# Patient Record
Sex: MALE | NOT HISPANIC OR LATINO | URBAN - METROPOLITAN AREA
[De-identification: names, ages, dates, MRNs, and addresses within clinical notes are randomized per-mention and may not be internally consistent; named-entity substitution may affect disease eponyms.]

---

## 2019-01-04 ENCOUNTER — HOSPITAL ENCOUNTER (OUTPATIENT)
Dept: OTHER | Facility: HOSPITAL | Age: 69
Discharge: HOME | End: 2019-01-04

## 2019-01-04 LAB
ALBUMIN SERPL-MCNC: 4.4 G/DL (ref 3.5–5)
ALBUMIN/GLOB SERPL: 1.3 {RATIO} (ref 1.4–2.6)
ALP SERPL-CCNC: 108 U/L (ref 56–155)
ALT SERPL-CCNC: 24 U/L (ref 10–40)
ANION GAP SERPL CALC-SCNC: 21 MMOL/L (ref 8–19)
AST SERPL-CCNC: 20 U/L (ref 15–50)
BASOPHILS # BLD AUTO: 0.01 10*3/UL (ref 0–0.2)
BASOPHILS NFR BLD AUTO: 0.18 % (ref 0–3)
BILIRUB SERPL-MCNC: 0.54 MG/DL (ref 0.2–1.3)
BUN SERPL-MCNC: 14 MG/DL (ref 5–25)
BUN/CREAT SERPL: 9 {RATIO} (ref 6–20)
CALCIUM SERPL-MCNC: 9.1 MG/DL (ref 8.7–10.4)
CHLORIDE SERPL-SCNC: 100 MMOL/L (ref 99–111)
CHOLEST SERPL-MCNC: 106 MG/DL (ref 107–200)
CHOLEST/HDLC SERPL: 3.2 {RATIO} (ref 3–6)
CK SERPL-CCNC: 222 U/L (ref 57–374)
CONV CO2: 23 MMOL/L (ref 22–32)
CONV TOTAL PROTEIN: 7.9 G/DL (ref 6.3–8.2)
CREAT UR-MCNC: 1.5 MG/DL (ref 0.7–1.2)
EOSINOPHIL # BLD AUTO: 0.12 10*3/UL (ref 0–0.7)
EOSINOPHIL # BLD AUTO: 2.55 % (ref 0–7)
ERYTHROCYTE [DISTWIDTH] IN BLOOD BY AUTOMATED COUNT: 13.4 % (ref 11.5–14.5)
GFR SERPLBLD BASED ON 1.73 SQ M-ARVRAT: 55 ML/MIN/{1.73_M2}
GLOBULIN UR ELPH-MCNC: 3.5 G/DL (ref 2–3.5)
GLUCOSE SERPL-MCNC: 174 MG/DL (ref 70–99)
HBA1C MFR BLD: 14.2 G/DL (ref 14–18)
HCT VFR BLD AUTO: 45.7 % (ref 42–52)
HDLC SERPL-MCNC: 33 MG/DL (ref 40–60)
LDLC SERPL CALC-MCNC: 46 MG/DL (ref 70–100)
LYMPHOCYTES # BLD AUTO: 1.27 10*3/UL (ref 1–5)
MAGNESIUM SERPL-MCNC: 1.56 MG/DL (ref 1.6–2.3)
MCH RBC QN AUTO: 25.7 PG (ref 27–31)
MCHC RBC AUTO-ENTMCNC: 31.1 G/DL (ref 33–37)
MCV RBC AUTO: 82.5 FL (ref 80–96)
MONOCYTES # BLD AUTO: 0.55 10*3/UL (ref 0.2–1.2)
MONOCYTES NFR BLD AUTO: 11.2 % (ref 3–10)
NEUTROPHILS # BLD AUTO: 2.93 10*3/UL (ref 2–8)
NEUTROPHILS NFR BLD AUTO: 60.1 % (ref 30–85)
NRBC BLD AUTO-RTO: 0 % (ref 0–0.01)
OSMOLALITY SERPL CALC.SUM OF ELEC: 297 MOSM/KG (ref 273–304)
PHOSPHATE SERPL-MCNC: 2.9 MG/DL (ref 2.4–4.5)
PLATELET # BLD AUTO: 231 10*3/UL (ref 130–400)
PMV BLD AUTO: 9 FL (ref 7.4–10.4)
POTASSIUM SERPL-SCNC: 3.3 MMOL/L (ref 3.5–5.3)
RBC # BLD AUTO: 5.54 10*6/UL (ref 4.7–6.1)
SODIUM SERPL-SCNC: 141 MMOL/L (ref 135–147)
TRIGL SERPL-MCNC: 134 MG/DL (ref 40–150)
URATE SERPL-MCNC: 6 MG/DL (ref 3.5–8.5)
VARIANT LYMPHS NFR BLD MANUAL: 26 % (ref 20–45)
VLDLC SERPL-MCNC: 27 MG/DL (ref 5–37)
WBC # BLD AUTO: 4.88 10*3/UL (ref 4.8–10.8)

## 2019-01-17 ENCOUNTER — HOSPITAL ENCOUNTER (OUTPATIENT)
Dept: OTHER | Facility: HOSPITAL | Age: 69
Discharge: HOME | End: 2019-01-17

## 2019-01-17 LAB
ALBUMIN SERPL-MCNC: 4.2 G/DL (ref 3.5–5)
ALBUMIN/GLOB SERPL: 1.1 {RATIO} (ref 1.4–2.6)
ALP SERPL-CCNC: 110 U/L (ref 56–155)
ALT SERPL-CCNC: 29 U/L (ref 10–40)
ANION GAP SERPL CALC-SCNC: 24 MMOL/L (ref 8–19)
AST SERPL-CCNC: 22 U/L (ref 15–50)
BILIRUB SERPL-MCNC: 0.36 MG/DL (ref 0.2–1.3)
BUN SERPL-MCNC: 16 MG/DL (ref 5–25)
BUN/CREAT SERPL: 10 {RATIO} (ref 6–20)
CALCIUM SERPL-MCNC: 9.6 MG/DL (ref 8.7–10.4)
CHLORIDE SERPL-SCNC: 104 MMOL/L (ref 99–111)
CONV CO2: 24 MMOL/L (ref 22–32)
CONV TOTAL PROTEIN: 7.9 G/DL (ref 6.3–8.2)
CREAT UR-MCNC: 1.67 MG/DL (ref 0.7–1.2)
GFR SERPLBLD BASED ON 1.73 SQ M-ARVRAT: 48 ML/MIN/{1.73_M2}
GLOBULIN UR ELPH-MCNC: 3.7 G/DL (ref 2–3.5)
GLUCOSE SERPL-MCNC: 147 MG/DL (ref 70–99)
OSMOLALITY SERPL CALC.SUM OF ELEC: 310 MOSM/KG (ref 273–304)
POTASSIUM SERPL-SCNC: 3.6 MMOL/L (ref 3.5–5.3)
SODIUM SERPL-SCNC: 148 MMOL/L (ref 135–147)

## 2019-03-07 ENCOUNTER — HOSPITAL ENCOUNTER (OUTPATIENT)
Dept: OTHER | Facility: HOSPITAL | Age: 69
Discharge: HOME | End: 2019-03-07

## 2019-03-07 LAB
ALBUMIN SERPL-MCNC: 4.3 G/DL (ref 3.5–5)
ALBUMIN/GLOB SERPL: 1.3 {RATIO} (ref 1.4–2.6)
ALP SERPL-CCNC: 98 U/L (ref 56–155)
ALT SERPL-CCNC: 27 U/L (ref 10–40)
ANION GAP SERPL CALC-SCNC: 17 MMOL/L (ref 8–19)
AST SERPL-CCNC: 22 U/L (ref 15–50)
BASOPHILS # BLD AUTO: 0.06 10*3/UL (ref 0–0.2)
BASOPHILS NFR BLD AUTO: 1.1 % (ref 0–3)
BILIRUB SERPL-MCNC: 0.3 MG/DL (ref 0.2–1.3)
BUN SERPL-MCNC: 13 MG/DL (ref 5–25)
BUN/CREAT SERPL: 9 {RATIO} (ref 6–20)
CALCIUM SERPL-MCNC: 9.1 MG/DL (ref 8.7–10.4)
CHLORIDE SERPL-SCNC: 103 MMOL/L (ref 99–111)
CHOLEST SERPL-MCNC: 101 MG/DL (ref 107–200)
CHOLEST/HDLC SERPL: 2.6 {RATIO} (ref 3–6)
CK SERPL-CCNC: 138 U/L (ref 57–374)
CONV ABS IMM GRAN: 0.01 10*3/UL (ref 0–0.2)
CONV CO2: 26 MMOL/L (ref 22–32)
CONV IMMATURE GRAN: 0.2 % (ref 0–1.8)
CONV TOTAL PROTEIN: 7.6 G/DL (ref 6.3–8.2)
CREAT UR-MCNC: 1.49 MG/DL (ref 0.7–1.2)
DEPRECATED RDW RBC AUTO: 42.3 FL (ref 35.1–43.9)
EOSINOPHIL # BLD AUTO: 0.16 10*3/UL (ref 0–0.7)
EOSINOPHIL # BLD AUTO: 3 % (ref 0–7)
ERYTHROCYTE [DISTWIDTH] IN BLOOD BY AUTOMATED COUNT: 14.5 % (ref 11.6–14.4)
GFR SERPLBLD BASED ON 1.73 SQ M-ARVRAT: 55 ML/MIN/{1.73_M2}
GLOBULIN UR ELPH-MCNC: 3.3 G/DL (ref 2–3.5)
GLUCOSE SERPL-MCNC: 155 MG/DL (ref 70–99)
HBA1C MFR BLD: 14.6 G/DL (ref 14–18)
HCT VFR BLD AUTO: 47.6 % (ref 42–52)
HDLC SERPL-MCNC: 39 MG/DL (ref 40–60)
LDLC SERPL CALC-MCNC: 37 MG/DL (ref 70–100)
LYMPHOCYTES # BLD AUTO: 1.23 10*3/UL (ref 1–5)
MAGNESIUM SERPL-MCNC: 1.5 MG/DL (ref 1.6–2.3)
MCH RBC QN AUTO: 25.1 PG (ref 27–31)
MCHC RBC AUTO-ENTMCNC: 30.7 G/DL (ref 33–37)
MCV RBC AUTO: 81.9 FL (ref 80–96)
MONOCYTES # BLD AUTO: 0.57 10*3/UL (ref 0.2–1.2)
MONOCYTES NFR BLD AUTO: 10.7 % (ref 3–10)
NEUTROPHILS # BLD AUTO: 3.32 10*3/UL (ref 2–8)
NEUTROPHILS NFR BLD AUTO: 62 % (ref 30–85)
NRBC CBCN: 0 % (ref 0–0.7)
OSMOLALITY SERPL CALC.SUM OF ELEC: 297 MOSM/KG (ref 273–304)
PHOSPHATE SERPL-MCNC: 2.3 MG/DL (ref 2.4–4.5)
PLATELET # BLD AUTO: 291 10*3/UL (ref 130–400)
PMV BLD AUTO: 11.2 FL (ref 9.4–12.4)
POTASSIUM SERPL-SCNC: 3.5 MMOL/L (ref 3.5–5.3)
RBC # BLD AUTO: 5.81 10*6/UL (ref 4.7–6.1)
SODIUM SERPL-SCNC: 142 MMOL/L (ref 135–147)
TRIGL SERPL-MCNC: 126 MG/DL (ref 40–150)
URATE SERPL-MCNC: 5.1 MG/DL (ref 3.5–8.5)
VARIANT LYMPHS NFR BLD MANUAL: 23 % (ref 20–45)
VLDLC SERPL-MCNC: 25 MG/DL (ref 5–37)
WBC # BLD AUTO: 5.35 10*3/UL (ref 4.8–10.8)

## 2019-04-08 ENCOUNTER — HOSPITAL ENCOUNTER (OUTPATIENT)
Dept: OTHER | Facility: HOSPITAL | Age: 69
Discharge: HOME | End: 2019-04-08

## 2019-04-08 LAB
ALBUMIN SERPL-MCNC: 4.2 G/DL (ref 3.5–5)
ALBUMIN/GLOB SERPL: 1.2 {RATIO} (ref 1.4–2.6)
ALP SERPL-CCNC: 93 U/L (ref 56–155)
ALT SERPL-CCNC: 25 U/L (ref 10–40)
ANION GAP SERPL CALC-SCNC: 18 MMOL/L (ref 8–19)
AST SERPL-CCNC: 23 U/L (ref 15–50)
BASOPHILS # BLD AUTO: 0.06 10*3/UL (ref 0–0.2)
BASOPHILS NFR BLD AUTO: 0.9 % (ref 0–3)
BILIRUB SERPL-MCNC: 0.32 MG/DL (ref 0.2–1.3)
BUN SERPL-MCNC: 18 MG/DL (ref 5–25)
BUN/CREAT SERPL: 10 {RATIO} (ref 6–20)
CALCIUM SERPL-MCNC: 9.2 MG/DL (ref 8.7–10.4)
CHLORIDE SERPL-SCNC: 103 MMOL/L (ref 99–111)
CHOLEST SERPL-MCNC: 106 MG/DL (ref 107–200)
CHOLEST/HDLC SERPL: 2.9 {RATIO} (ref 3–6)
CK SERPL-CCNC: 127 U/L (ref 57–374)
CONV ABS IMM GRAN: 0.01 10*3/UL (ref 0–0.2)
CONV CO2: 25 MMOL/L (ref 22–32)
CONV IMMATURE GRAN: 0.2 % (ref 0–1.8)
CONV TOTAL PROTEIN: 7.6 G/DL (ref 6.3–8.2)
CREAT UR-MCNC: 1.72 MG/DL (ref 0.7–1.2)
DEPRECATED RDW RBC AUTO: 42.7 FL (ref 35.1–43.9)
EOSINOPHIL # BLD AUTO: 0.16 10*3/UL (ref 0–0.7)
EOSINOPHIL # BLD AUTO: 2.5 % (ref 0–7)
ERYTHROCYTE [DISTWIDTH] IN BLOOD BY AUTOMATED COUNT: 14.6 % (ref 11.6–14.4)
GFR SERPLBLD BASED ON 1.73 SQ M-ARVRAT: 46 ML/MIN/{1.73_M2}
GLOBULIN UR ELPH-MCNC: 3.4 G/DL (ref 2–3.5)
GLUCOSE SERPL-MCNC: 156 MG/DL (ref 70–99)
HBA1C MFR BLD: 13.5 G/DL (ref 14–18)
HCT VFR BLD AUTO: 43.7 % (ref 42–52)
HDLC SERPL-MCNC: 36 MG/DL (ref 40–60)
LDLC SERPL CALC-MCNC: 42 MG/DL (ref 70–100)
LYMPHOCYTES # BLD AUTO: 1.44 10*3/UL (ref 1–5)
MAGNESIUM SERPL-MCNC: 1.53 MG/DL (ref 1.6–2.3)
MCH RBC QN AUTO: 25.2 PG (ref 27–31)
MCHC RBC AUTO-ENTMCNC: 30.9 G/DL (ref 33–37)
MCV RBC AUTO: 81.7 FL (ref 80–96)
MONOCYTES # BLD AUTO: 0.67 10*3/UL (ref 0.2–1.2)
MONOCYTES NFR BLD AUTO: 10.4 % (ref 3–10)
NEUTROPHILS # BLD AUTO: 4.13 10*3/UL (ref 2–8)
NEUTROPHILS NFR BLD AUTO: 63.7 % (ref 30–85)
NRBC CBCN: 0 % (ref 0–0.7)
OSMOLALITY SERPL CALC.SUM OF ELEC: 299 MOSM/KG (ref 273–304)
PHOSPHATE SERPL-MCNC: 2.8 MG/DL (ref 2.4–4.5)
PLATELET # BLD AUTO: 282 10*3/UL (ref 130–400)
PMV BLD AUTO: 11.2 FL (ref 9.4–12.4)
POTASSIUM SERPL-SCNC: 3.8 MMOL/L (ref 3.5–5.3)
RBC # BLD AUTO: 5.35 10*6/UL (ref 4.7–6.1)
SODIUM SERPL-SCNC: 142 MMOL/L (ref 135–147)
TRIGL SERPL-MCNC: 141 MG/DL (ref 40–150)
URATE SERPL-MCNC: 5.5 MG/DL (ref 3.5–8.5)
VARIANT LYMPHS NFR BLD MANUAL: 22.3 % (ref 20–45)
VLDLC SERPL-MCNC: 28 MG/DL (ref 5–37)
WBC # BLD AUTO: 6.47 10*3/UL (ref 4.8–10.8)

## 2019-05-02 ENCOUNTER — HOSPITAL ENCOUNTER (OUTPATIENT)
Dept: OTHER | Facility: HOSPITAL | Age: 69
Discharge: HOME | End: 2019-05-02

## 2019-05-02 LAB
ALBUMIN SERPL-MCNC: 4.4 G/DL (ref 3.5–5)
ALBUMIN/GLOB SERPL: 1.3 {RATIO} (ref 1.4–2.6)
ALP SERPL-CCNC: 99 U/L (ref 56–155)
ALT SERPL-CCNC: 24 U/L (ref 10–40)
ANION GAP SERPL CALC-SCNC: 19 MMOL/L (ref 8–19)
AST SERPL-CCNC: 18 U/L (ref 15–50)
BASOPHILS # BLD AUTO: 0.04 10*3/UL (ref 0–0.2)
BASOPHILS NFR BLD AUTO: 0.6 % (ref 0–3)
BILIRUB SERPL-MCNC: 0.38 MG/DL (ref 0.2–1.3)
BUN SERPL-MCNC: 13 MG/DL (ref 5–25)
BUN/CREAT SERPL: 9 {RATIO} (ref 6–20)
CALCIUM SERPL-MCNC: 9.1 MG/DL (ref 8.7–10.4)
CHLORIDE SERPL-SCNC: 98 MMOL/L (ref 99–111)
CHOLEST SERPL-MCNC: 106 MG/DL (ref 107–200)
CHOLEST/HDLC SERPL: 2.8 {RATIO} (ref 3–6)
CK SERPL-CCNC: 164 U/L (ref 57–374)
CONV ABS IMM GRAN: 0.02 10*3/UL (ref 0–0.2)
CONV CO2: 26 MMOL/L (ref 22–32)
CONV IMMATURE GRAN: 0.3 % (ref 0–1.8)
CONV TOTAL PROTEIN: 7.8 G/DL (ref 6.3–8.2)
CREAT UR-MCNC: 1.46 MG/DL (ref 0.7–1.2)
DEPRECATED RDW RBC AUTO: 43.9 FL (ref 35.1–43.9)
EOSINOPHIL # BLD AUTO: 0.13 10*3/UL (ref 0–0.7)
EOSINOPHIL # BLD AUTO: 2 % (ref 0–7)
ERYTHROCYTE [DISTWIDTH] IN BLOOD BY AUTOMATED COUNT: 14.7 % (ref 11.6–14.4)
GFR SERPLBLD BASED ON 1.73 SQ M-ARVRAT: 57 ML/MIN/{1.73_M2}
GLOBULIN UR ELPH-MCNC: 3.4 G/DL (ref 2–3.5)
GLUCOSE SERPL-MCNC: 203 MG/DL (ref 70–99)
HBA1C MFR BLD: 13.5 G/DL (ref 14–18)
HCT VFR BLD AUTO: 44.8 % (ref 42–52)
HDLC SERPL-MCNC: 38 MG/DL (ref 40–60)
LDLC SERPL CALC-MCNC: 43 MG/DL (ref 70–100)
LYMPHOCYTES # BLD AUTO: 1.25 10*3/UL (ref 1–5)
MAGNESIUM SERPL-MCNC: 1.32 MG/DL (ref 1.6–2.3)
MCH RBC QN AUTO: 24.7 PG (ref 27–31)
MCHC RBC AUTO-ENTMCNC: 30.1 G/DL (ref 33–37)
MCV RBC AUTO: 82.1 FL (ref 80–96)
MONOCYTES # BLD AUTO: 0.68 10*3/UL (ref 0.2–1.2)
MONOCYTES NFR BLD AUTO: 10.4 % (ref 3–10)
NEUTROPHILS # BLD AUTO: 4.39 10*3/UL (ref 2–8)
NEUTROPHILS NFR BLD AUTO: 67.5 % (ref 30–85)
NRBC CBCN: 0 % (ref 0–0.7)
OSMOLALITY SERPL CALC.SUM OF ELEC: 294 MOSM/KG (ref 273–304)
PHOSPHATE SERPL-MCNC: 2.6 MG/DL (ref 2.4–4.5)
PLATELET # BLD AUTO: 293 10*3/UL (ref 130–400)
PMV BLD AUTO: 11.3 FL (ref 9.4–12.4)
POTASSIUM SERPL-SCNC: 4 MMOL/L (ref 3.5–5.3)
RBC # BLD AUTO: 5.46 10*6/UL (ref 4.7–6.1)
SODIUM SERPL-SCNC: 139 MMOL/L (ref 135–147)
TRIGL SERPL-MCNC: 127 MG/DL (ref 40–150)
URATE SERPL-MCNC: 5.1 MG/DL (ref 3.5–8.5)
VARIANT LYMPHS NFR BLD MANUAL: 19.2 % (ref 20–45)
VLDLC SERPL-MCNC: 25 MG/DL (ref 5–37)
WBC # BLD AUTO: 6.51 10*3/UL (ref 4.8–10.8)

## 2019-06-06 ENCOUNTER — HOSPITAL ENCOUNTER (OUTPATIENT)
Dept: OTHER | Facility: HOSPITAL | Age: 69
Discharge: HOME | End: 2019-06-06

## 2019-06-06 LAB
ALBUMIN SERPL-MCNC: 4.3 G/DL (ref 3.5–5)
ALBUMIN/GLOB SERPL: 1.3 {RATIO} (ref 1.4–2.6)
ALP SERPL-CCNC: 105 U/L (ref 56–155)
ALT SERPL-CCNC: 24 U/L (ref 10–40)
ANION GAP SERPL CALC-SCNC: 21 MMOL/L (ref 8–19)
AST SERPL-CCNC: 18 U/L (ref 15–50)
BASOPHILS # BLD AUTO: 0.05 10*3/UL (ref 0–0.2)
BASOPHILS NFR BLD AUTO: 0.7 % (ref 0–3)
BILIRUB SERPL-MCNC: 0.43 MG/DL (ref 0.2–1.3)
BUN SERPL-MCNC: 13 MG/DL (ref 5–25)
BUN/CREAT SERPL: 8 {RATIO} (ref 6–20)
CALCIUM SERPL-MCNC: 9.5 MG/DL (ref 8.7–10.4)
CHLORIDE SERPL-SCNC: 103 MMOL/L (ref 99–111)
CHOLEST SERPL-MCNC: 106 MG/DL (ref 107–200)
CHOLEST/HDLC SERPL: 2.9 {RATIO} (ref 3–6)
CK SERPL-CCNC: 163 U/L (ref 57–374)
CONV ABS IMM GRAN: 0.02 10*3/UL (ref 0–0.2)
CONV CO2: 24 MMOL/L (ref 22–32)
CONV IMMATURE GRAN: 0.3 % (ref 0–1.8)
CONV TOTAL PROTEIN: 7.6 G/DL (ref 6.3–8.2)
CREAT UR-MCNC: 1.6 MG/DL (ref 0.7–1.2)
DEPRECATED RDW RBC AUTO: 44 FL (ref 35.1–43.9)
EOSINOPHIL # BLD AUTO: 0.15 10*3/UL (ref 0–0.7)
EOSINOPHIL # BLD AUTO: 2 % (ref 0–7)
ERYTHROCYTE [DISTWIDTH] IN BLOOD BY AUTOMATED COUNT: 14.7 % (ref 11.6–14.4)
GFR SERPLBLD BASED ON 1.73 SQ M-ARVRAT: 51 ML/MIN/{1.73_M2}
GLOBULIN UR ELPH-MCNC: 3.3 G/DL (ref 2–3.5)
GLUCOSE SERPL-MCNC: 168 MG/DL (ref 70–99)
HBA1C MFR BLD: 13.8 G/DL (ref 14–18)
HCT VFR BLD AUTO: 45.3 % (ref 42–52)
HDLC SERPL-MCNC: 37 MG/DL (ref 40–60)
LDLC SERPL CALC-MCNC: 47 MG/DL (ref 70–100)
LYMPHOCYTES # BLD AUTO: 1.44 10*3/UL (ref 1–5)
MAGNESIUM SERPL-MCNC: 1.46 MG/DL (ref 1.6–2.3)
MCH RBC QN AUTO: 25.4 PG (ref 27–31)
MCHC RBC AUTO-ENTMCNC: 30.5 G/DL (ref 33–37)
MCV RBC AUTO: 83.3 FL (ref 80–96)
MONOCYTES # BLD AUTO: 0.91 10*3/UL (ref 0.2–1.2)
MONOCYTES NFR BLD AUTO: 12.2 % (ref 3–10)
NEUTROPHILS # BLD AUTO: 4.86 10*3/UL (ref 2–8)
NEUTROPHILS NFR BLD AUTO: 65.4 % (ref 30–85)
NRBC CBCN: 0 % (ref 0–0.7)
OSMOLALITY SERPL CALC.SUM OF ELEC: 302 MOSM/KG (ref 273–304)
PHOSPHATE SERPL-MCNC: 3 MG/DL (ref 2.4–4.5)
PLATELET # BLD AUTO: 298 10*3/UL (ref 130–400)
PMV BLD AUTO: 11.4 FL (ref 9.4–12.4)
POTASSIUM SERPL-SCNC: 3.8 MMOL/L (ref 3.5–5.3)
RBC # BLD AUTO: 5.44 10*6/UL (ref 4.7–6.1)
SODIUM SERPL-SCNC: 144 MMOL/L (ref 135–147)
TRIGL SERPL-MCNC: 110 MG/DL (ref 40–150)
URATE SERPL-MCNC: 5.2 MG/DL (ref 3.5–8.5)
VARIANT LYMPHS NFR BLD MANUAL: 19.4 % (ref 20–45)
VLDLC SERPL-MCNC: 22 MG/DL (ref 5–37)
WBC # BLD AUTO: 7.43 10*3/UL (ref 4.8–10.8)

## 2019-07-09 ENCOUNTER — HOSPITAL ENCOUNTER (OUTPATIENT)
Dept: OTHER | Facility: HOSPITAL | Age: 69
Discharge: HOME | End: 2019-07-09

## 2019-07-09 LAB
ALBUMIN SERPL-MCNC: 4.4 G/DL (ref 3.5–5)
ALBUMIN/GLOB SERPL: 1.4 {RATIO} (ref 1.4–2.6)
ALP SERPL-CCNC: 97 U/L (ref 56–155)
ALT SERPL-CCNC: 26 U/L (ref 10–40)
ANION GAP SERPL CALC-SCNC: 21 MMOL/L (ref 8–19)
AST SERPL-CCNC: 20 U/L (ref 15–50)
BASOPHILS # BLD AUTO: 0.04 10*3/UL (ref 0–0.2)
BASOPHILS NFR BLD AUTO: 0.7 % (ref 0–3)
BILIRUB SERPL-MCNC: 0.37 MG/DL (ref 0.2–1.3)
BUN SERPL-MCNC: 15 MG/DL (ref 5–25)
BUN/CREAT SERPL: 9 {RATIO} (ref 6–20)
CALCIUM SERPL-MCNC: 9.2 MG/DL (ref 8.7–10.4)
CHLORIDE SERPL-SCNC: 102 MMOL/L (ref 99–111)
CHOLEST SERPL-MCNC: 99 MG/DL (ref 107–200)
CHOLEST/HDLC SERPL: 2.7 {RATIO} (ref 3–6)
CK SERPL-CCNC: 165 U/L (ref 57–374)
CONV ABS IMM GRAN: 0.02 10*3/UL (ref 0–0.2)
CONV CO2: 24 MMOL/L (ref 22–32)
CONV IMMATURE GRAN: 0.3 % (ref 0–1.8)
CONV TOTAL PROTEIN: 7.6 G/DL (ref 6.3–8.2)
CREAT UR-MCNC: 1.73 MG/DL (ref 0.7–1.2)
DEPRECATED RDW RBC AUTO: 42.5 FL (ref 35.1–43.9)
EOSINOPHIL # BLD AUTO: 0.15 10*3/UL (ref 0–0.7)
EOSINOPHIL # BLD AUTO: 2.5 % (ref 0–7)
ERYTHROCYTE [DISTWIDTH] IN BLOOD BY AUTOMATED COUNT: 14.4 % (ref 11.6–14.4)
EST. AVERAGE GLUCOSE BLD GHB EST-MCNC: 166 MG/DL
GFR SERPLBLD BASED ON 1.73 SQ M-ARVRAT: 46 ML/MIN/{1.73_M2}
GLOBULIN UR ELPH-MCNC: 3.2 G/DL (ref 2–3.5)
GLUCOSE SERPL-MCNC: 149 MG/DL (ref 70–99)
HBA1C MFR BLD: 13.7 G/DL (ref 14–18)
HBA1C MFR BLD: 7.4 % (ref 3.5–5.7)
HCT VFR BLD AUTO: 44.1 % (ref 42–52)
HDLC SERPL-MCNC: 37 MG/DL (ref 40–60)
LDLC SERPL CALC-MCNC: 34 MG/DL (ref 70–100)
LYMPHOCYTES # BLD AUTO: 1.54 10*3/UL (ref 1–5)
MAGNESIUM SERPL-MCNC: 1.46 MG/DL (ref 1.6–2.3)
MCH RBC QN AUTO: 25.6 PG (ref 27–31)
MCHC RBC AUTO-ENTMCNC: 31.1 G/DL (ref 33–37)
MCV RBC AUTO: 82.4 FL (ref 80–96)
MONOCYTES # BLD AUTO: 0.66 10*3/UL (ref 0.2–1.2)
MONOCYTES NFR BLD AUTO: 10.8 % (ref 3–10)
NEUTROPHILS # BLD AUTO: 3.7 10*3/UL (ref 2–8)
NEUTROPHILS NFR BLD AUTO: 60.5 % (ref 30–85)
NRBC CBCN: 0 % (ref 0–0.7)
OSMOLALITY SERPL CALC.SUM OF ELEC: 300 MOSM/KG (ref 273–304)
PHOSPHATE SERPL-MCNC: 2.5 MG/DL (ref 2.4–4.5)
PLATELET # BLD AUTO: 275 10*3/UL (ref 130–400)
PMV BLD AUTO: 11 FL (ref 9.4–12.4)
POTASSIUM SERPL-SCNC: 3.6 MMOL/L (ref 3.5–5.3)
RBC # BLD AUTO: 5.35 10*6/UL (ref 4.7–6.1)
SODIUM SERPL-SCNC: 143 MMOL/L (ref 135–147)
TRIGL SERPL-MCNC: 141 MG/DL (ref 40–150)
URATE SERPL-MCNC: 4.9 MG/DL (ref 3.5–8.5)
VARIANT LYMPHS NFR BLD MANUAL: 25.2 % (ref 20–45)
VLDLC SERPL-MCNC: 28 MG/DL (ref 5–37)
WBC # BLD AUTO: 6.11 10*3/UL (ref 4.8–10.8)

## 2019-08-01 ENCOUNTER — HOSPITAL ENCOUNTER (OUTPATIENT)
Dept: OTHER | Facility: HOSPITAL | Age: 69
Discharge: HOME | End: 2019-08-01

## 2019-08-01 LAB
ALBUMIN SERPL-MCNC: 4.5 G/DL (ref 3.5–5)
ALBUMIN/GLOB SERPL: 1.4 {RATIO} (ref 1.4–2.6)
ALP SERPL-CCNC: 103 U/L (ref 56–155)
ALT SERPL-CCNC: 20 U/L (ref 10–40)
ANION GAP SERPL CALC-SCNC: 21 MMOL/L (ref 8–19)
AST SERPL-CCNC: 18 U/L (ref 15–50)
BASOPHILS # BLD AUTO: 0.05 10*3/UL (ref 0–0.2)
BASOPHILS NFR BLD AUTO: 0.8 % (ref 0–3)
BILIRUB SERPL-MCNC: 0.48 MG/DL (ref 0.2–1.3)
BUN SERPL-MCNC: 17 MG/DL (ref 5–25)
BUN/CREAT SERPL: 10 {RATIO} (ref 6–20)
CALCIUM SERPL-MCNC: 9.3 MG/DL (ref 8.7–10.4)
CHLORIDE SERPL-SCNC: 102 MMOL/L (ref 99–111)
CHOLEST SERPL-MCNC: 119 MG/DL (ref 107–200)
CHOLEST/HDLC SERPL: 3.5 {RATIO} (ref 3–6)
CK SERPL-CCNC: 153 U/L (ref 57–374)
CONV ABS IMM GRAN: 0.02 10*3/UL (ref 0–0.2)
CONV CO2: 24 MMOL/L (ref 22–32)
CONV IMMATURE GRAN: 0.3 % (ref 0–1.8)
CONV TOTAL PROTEIN: 7.7 G/DL (ref 6.3–8.2)
CREAT UR-MCNC: 1.69 MG/DL (ref 0.7–1.2)
DEPRECATED RDW RBC AUTO: 42.8 FL (ref 35.1–43.9)
EOSINOPHIL # BLD AUTO: 0.16 10*3/UL (ref 0–0.7)
EOSINOPHIL # BLD AUTO: 2.4 % (ref 0–7)
ERYTHROCYTE [DISTWIDTH] IN BLOOD BY AUTOMATED COUNT: 14.4 % (ref 11.6–14.4)
GFR SERPLBLD BASED ON 1.73 SQ M-ARVRAT: 47 ML/MIN/{1.73_M2}
GLOBULIN UR ELPH-MCNC: 3.2 G/DL (ref 2–3.5)
GLUCOSE SERPL-MCNC: 165 MG/DL (ref 70–99)
HBA1C MFR BLD: 14 G/DL (ref 14–18)
HCT VFR BLD AUTO: 45.8 % (ref 42–52)
HDLC SERPL-MCNC: 34 MG/DL (ref 40–60)
LDLC SERPL CALC-MCNC: 45 MG/DL (ref 70–100)
LYMPHOCYTES # BLD AUTO: 1.74 10*3/UL (ref 1–5)
MAGNESIUM SERPL-MCNC: 1.45 MG/DL (ref 1.6–2.3)
MCH RBC QN AUTO: 25.3 PG (ref 27–31)
MCHC RBC AUTO-ENTMCNC: 30.6 G/DL (ref 33–37)
MCV RBC AUTO: 82.8 FL (ref 80–96)
MONOCYTES # BLD AUTO: 0.58 10*3/UL (ref 0.2–1.2)
MONOCYTES NFR BLD AUTO: 8.8 % (ref 3–10)
NEUTROPHILS # BLD AUTO: 4.01 10*3/UL (ref 2–8)
NEUTROPHILS NFR BLD AUTO: 61.2 % (ref 30–85)
NRBC CBCN: 0 % (ref 0–0.7)
OSMOLALITY SERPL CALC.SUM OF ELEC: 301 MOSM/KG (ref 273–304)
PHOSPHATE SERPL-MCNC: 2.6 MG/DL (ref 2.4–4.5)
PLATELET # BLD AUTO: 285 10*3/UL (ref 130–400)
PMV BLD AUTO: 10.8 FL (ref 9.4–12.4)
POTASSIUM SERPL-SCNC: 4 MMOL/L (ref 3.5–5.3)
RBC # BLD AUTO: 5.53 10*6/UL (ref 4.7–6.1)
SODIUM SERPL-SCNC: 143 MMOL/L (ref 135–147)
TRIGL SERPL-MCNC: 198 MG/DL (ref 40–150)
URATE SERPL-MCNC: 5.9 MG/DL (ref 3.5–8.5)
VARIANT LYMPHS NFR BLD MANUAL: 26.5 % (ref 20–45)
VLDLC SERPL-MCNC: 40 MG/DL (ref 5–37)
WBC # BLD AUTO: 6.56 10*3/UL (ref 4.8–10.8)

## 2019-09-05 ENCOUNTER — HOSPITAL ENCOUNTER (OUTPATIENT)
Dept: OTHER | Facility: HOSPITAL | Age: 69
Discharge: HOME | End: 2019-09-05

## 2019-09-05 LAB
ALBUMIN SERPL-MCNC: 4.5 G/DL (ref 3.5–5)
ALBUMIN/GLOB SERPL: 1.5 {RATIO} (ref 1.4–2.6)
ALP SERPL-CCNC: 111 U/L (ref 56–155)
ALT SERPL-CCNC: 20 U/L (ref 10–40)
ANION GAP SERPL CALC-SCNC: 24 MMOL/L (ref 8–19)
AST SERPL-CCNC: 19 U/L (ref 15–50)
BASOPHILS # BLD AUTO: 0.05 10*3/UL (ref 0–0.2)
BASOPHILS NFR BLD AUTO: 0.7 % (ref 0–3)
BILIRUB SERPL-MCNC: 0.37 MG/DL (ref 0.2–1.3)
BUN SERPL-MCNC: 14 MG/DL (ref 5–25)
BUN/CREAT SERPL: 8 {RATIO} (ref 6–20)
CALCIUM SERPL-MCNC: 9 MG/DL (ref 8.7–10.4)
CHLORIDE SERPL-SCNC: 107 MMOL/L (ref 99–111)
CHOLEST SERPL-MCNC: 113 MG/DL (ref 107–200)
CHOLEST/HDLC SERPL: 3.2 {RATIO} (ref 3–6)
CK SERPL-CCNC: 170 U/L (ref 57–374)
CONV ABS IMM GRAN: 0.02 10*3/UL (ref 0–0.2)
CONV CO2: 19 MMOL/L (ref 22–32)
CONV IMMATURE GRAN: 0.3 % (ref 0–1.8)
CONV TOTAL PROTEIN: 7.6 G/DL (ref 6.3–8.2)
CREAT UR-MCNC: 1.69 MG/DL (ref 0.7–1.2)
DEPRECATED RDW RBC AUTO: 42 FL (ref 35.1–43.9)
EOSINOPHIL # BLD AUTO: 0.17 10*3/UL (ref 0–0.7)
EOSINOPHIL # BLD AUTO: 2.5 % (ref 0–7)
ERYTHROCYTE [DISTWIDTH] IN BLOOD BY AUTOMATED COUNT: 14.5 % (ref 11.6–14.4)
GFR SERPLBLD BASED ON 1.73 SQ M-ARVRAT: 47 ML/MIN/{1.73_M2}
GLOBULIN UR ELPH-MCNC: 3.1 G/DL (ref 2–3.5)
GLUCOSE SERPL-MCNC: 177 MG/DL (ref 70–99)
HCT VFR BLD AUTO: 44.3 % (ref 42–52)
HDLC SERPL-MCNC: 35 MG/DL (ref 40–60)
HGB BLD-MCNC: 13.6 G/DL (ref 14–18)
LDLC SERPL CALC-MCNC: 41 MG/DL (ref 70–100)
LYMPHOCYTES # BLD AUTO: 1.51 10*3/UL (ref 1–5)
LYMPHOCYTES NFR BLD AUTO: 21.9 % (ref 20–45)
MAGNESIUM SERPL-MCNC: 1.52 MG/DL (ref 1.6–2.3)
MCH RBC QN AUTO: 25.1 PG (ref 27–31)
MCHC RBC AUTO-ENTMCNC: 30.7 G/DL (ref 33–37)
MCV RBC AUTO: 81.9 FL (ref 80–96)
MONOCYTES # BLD AUTO: 0.88 10*3/UL (ref 0.2–1.2)
MONOCYTES NFR BLD AUTO: 12.8 % (ref 3–10)
NEUTROPHILS # BLD AUTO: 4.25 10*3/UL (ref 2–8)
NEUTROPHILS NFR BLD AUTO: 61.8 % (ref 30–85)
NRBC CBCN: 0 % (ref 0–0.7)
OSMOLALITY SERPL CALC.SUM OF ELEC: 307 MOSM/KG (ref 273–304)
PHOSPHATE SERPL-MCNC: 2.9 MG/DL (ref 2.4–4.5)
PLATELET # BLD AUTO: 299 10*3/UL (ref 130–400)
PMV BLD AUTO: 11 FL (ref 9.4–12.4)
POTASSIUM SERPL-SCNC: 3.7 MMOL/L (ref 3.5–5.3)
RBC # BLD AUTO: 5.41 10*6/UL (ref 4.7–6.1)
SODIUM SERPL-SCNC: 146 MMOL/L (ref 135–147)
TRIGL SERPL-MCNC: 184 MG/DL (ref 40–150)
URATE SERPL-MCNC: 4.6 MG/DL (ref 3.5–8.5)
VLDLC SERPL-MCNC: 37 MG/DL (ref 5–37)
WBC # BLD AUTO: 6.88 10*3/UL (ref 4.8–10.8)

## 2019-10-04 ENCOUNTER — HOSPITAL ENCOUNTER (OUTPATIENT)
Dept: OTHER | Facility: HOSPITAL | Age: 69
Discharge: HOME | End: 2019-10-04

## 2019-10-04 LAB
ALBUMIN SERPL-MCNC: 4.1 G/DL (ref 3.5–5)
ALBUMIN/GLOB SERPL: 1.4 {RATIO} (ref 1.4–2.6)
ALP SERPL-CCNC: 93 U/L (ref 56–155)
ALT SERPL-CCNC: 22 U/L (ref 10–40)
ANION GAP SERPL CALC-SCNC: 18 MMOL/L (ref 8–19)
AST SERPL-CCNC: 18 U/L (ref 15–50)
BASOPHILS # BLD AUTO: 0.03 10*3/UL (ref 0–0.2)
BASOPHILS NFR BLD AUTO: 0.6 % (ref 0–3)
BILIRUB SERPL-MCNC: 0.25 MG/DL (ref 0.2–1.3)
BUN SERPL-MCNC: 16 MG/DL (ref 5–25)
BUN/CREAT SERPL: 10 {RATIO} (ref 6–20)
CALCIUM SERPL-MCNC: 9 MG/DL (ref 8.7–10.4)
CHLORIDE SERPL-SCNC: 106 MMOL/L (ref 99–111)
CHOLEST SERPL-MCNC: 96 MG/DL (ref 107–200)
CHOLEST/HDLC SERPL: 2.7 {RATIO} (ref 3–6)
CK SERPL-CCNC: 156 U/L (ref 57–374)
CONV ABS IMM GRAN: 0.01 10*3/UL (ref 0–0.2)
CONV CO2: 23 MMOL/L (ref 22–32)
CONV IMMATURE GRAN: 0.2 % (ref 0–1.8)
CONV TOTAL PROTEIN: 7.1 G/DL (ref 6.3–8.2)
CREAT UR-MCNC: 1.54 MG/DL (ref 0.7–1.2)
DEPRECATED RDW RBC AUTO: 44.2 FL (ref 35.1–43.9)
EOSINOPHIL # BLD AUTO: 0.14 10*3/UL (ref 0–0.7)
EOSINOPHIL # BLD AUTO: 3 % (ref 0–7)
ERYTHROCYTE [DISTWIDTH] IN BLOOD BY AUTOMATED COUNT: 14.8 % (ref 11.6–14.4)
EST. AVERAGE GLUCOSE BLD GHB EST-MCNC: 157 MG/DL
GFR SERPLBLD BASED ON 1.73 SQ M-ARVRAT: 53 ML/MIN/{1.73_M2}
GLOBULIN UR ELPH-MCNC: 3 G/DL (ref 2–3.5)
GLUCOSE SERPL-MCNC: 140 MG/DL (ref 70–99)
HBA1C MFR BLD: 7.1 % (ref 3.5–5.7)
HCT VFR BLD AUTO: 41.6 % (ref 42–52)
HDLC SERPL-MCNC: 35 MG/DL (ref 40–60)
HGB BLD-MCNC: 12.6 G/DL (ref 14–18)
LDLC SERPL CALC-MCNC: 41 MG/DL (ref 70–100)
LYMPHOCYTES # BLD AUTO: 0.83 10*3/UL (ref 1–5)
LYMPHOCYTES NFR BLD AUTO: 17.5 % (ref 20–45)
MAGNESIUM SERPL-MCNC: 1.55 MG/DL (ref 1.6–2.3)
MCH RBC QN AUTO: 25 PG (ref 27–31)
MCHC RBC AUTO-ENTMCNC: 30.3 G/DL (ref 33–37)
MCV RBC AUTO: 82.4 FL (ref 80–96)
MONOCYTES # BLD AUTO: 0.73 10*3/UL (ref 0.2–1.2)
MONOCYTES NFR BLD AUTO: 15.4 % (ref 3–10)
NEUTROPHILS # BLD AUTO: 3 10*3/UL (ref 2–8)
NEUTROPHILS NFR BLD AUTO: 63.3 % (ref 30–85)
NRBC CBCN: 0 % (ref 0–0.7)
OSMOLALITY SERPL CALC.SUM OF ELEC: 299 MOSM/KG (ref 273–304)
PHOSPHATE SERPL-MCNC: 3 MG/DL (ref 2.4–4.5)
PLATELET # BLD AUTO: 283 10*3/UL (ref 130–400)
PMV BLD AUTO: 10.7 FL (ref 9.4–12.4)
POTASSIUM SERPL-SCNC: 4.3 MMOL/L (ref 3.5–5.3)
RBC # BLD AUTO: 5.05 10*6/UL (ref 4.7–6.1)
SODIUM SERPL-SCNC: 143 MMOL/L (ref 135–147)
TRIGL SERPL-MCNC: 101 MG/DL (ref 40–150)
URATE SERPL-MCNC: 5.4 MG/DL (ref 3.5–8.5)
VLDLC SERPL-MCNC: 20 MG/DL (ref 5–37)
WBC # BLD AUTO: 4.74 10*3/UL (ref 4.8–10.8)

## 2019-11-08 ENCOUNTER — HOSPITAL ENCOUNTER (OUTPATIENT)
Dept: OTHER | Facility: HOSPITAL | Age: 69
Discharge: HOME | End: 2019-11-08

## 2019-11-08 LAB
ALBUMIN SERPL-MCNC: 4.4 G/DL (ref 3.5–5)
ALBUMIN/GLOB SERPL: 1.3 {RATIO} (ref 1.4–2.6)
ALP SERPL-CCNC: 99 U/L (ref 56–155)
ALT SERPL-CCNC: 16 U/L (ref 10–40)
ANION GAP SERPL CALC-SCNC: 22 MMOL/L (ref 8–19)
AST SERPL-CCNC: 17 U/L (ref 15–50)
BASOPHILS # BLD AUTO: 0.06 10*3/UL (ref 0–0.2)
BASOPHILS NFR BLD AUTO: 0.9 % (ref 0–3)
BILIRUB SERPL-MCNC: 0.42 MG/DL (ref 0.2–1.3)
BUN SERPL-MCNC: 12 MG/DL (ref 5–25)
BUN/CREAT SERPL: 7 {RATIO} (ref 6–20)
CALCIUM SERPL-MCNC: 9.3 MG/DL (ref 8.7–10.4)
CHLORIDE SERPL-SCNC: 101 MMOL/L (ref 99–111)
CHOLEST SERPL-MCNC: 103 MG/DL (ref 107–200)
CHOLEST/HDLC SERPL: 2.7 {RATIO} (ref 3–6)
CK SERPL-CCNC: 130 U/L (ref 57–374)
CONV ABS IMM GRAN: 0.03 10*3/UL (ref 0–0.2)
CONV CO2: 23 MMOL/L (ref 22–32)
CONV IMMATURE GRAN: 0.5 % (ref 0–1.8)
CONV TOTAL PROTEIN: 7.9 G/DL (ref 6.3–8.2)
CREAT UR-MCNC: 1.71 MG/DL (ref 0.7–1.2)
DEPRECATED RDW RBC AUTO: 42 FL (ref 35.1–43.9)
EOSINOPHIL # BLD AUTO: 0.16 10*3/UL (ref 0–0.7)
EOSINOPHIL # BLD AUTO: 2.4 % (ref 0–7)
ERYTHROCYTE [DISTWIDTH] IN BLOOD BY AUTOMATED COUNT: 14.5 % (ref 11.6–14.4)
GFR SERPLBLD BASED ON 1.73 SQ M-ARVRAT: 47 ML/MIN/{1.73_M2}
GLOBULIN UR ELPH-MCNC: 3.5 G/DL (ref 2–3.5)
GLUCOSE SERPL-MCNC: 116 MG/DL (ref 70–99)
HCT VFR BLD AUTO: 43.5 % (ref 42–52)
HDLC SERPL-MCNC: 38 MG/DL (ref 40–60)
HGB BLD-MCNC: 13.5 G/DL (ref 14–18)
LDLC SERPL CALC-MCNC: 38 MG/DL (ref 70–100)
LYMPHOCYTES # BLD AUTO: 1.4 10*3/UL (ref 1–5)
LYMPHOCYTES NFR BLD AUTO: 21.3 % (ref 20–45)
MAGNESIUM SERPL-MCNC: 1.34 MG/DL (ref 1.6–2.3)
MCH RBC QN AUTO: 25.1 PG (ref 27–31)
MCHC RBC AUTO-ENTMCNC: 31 G/DL (ref 33–37)
MCV RBC AUTO: 81 FL (ref 80–96)
MONOCYTES # BLD AUTO: 0.79 10*3/UL (ref 0.2–1.2)
MONOCYTES NFR BLD AUTO: 12 % (ref 3–10)
NEUTROPHILS # BLD AUTO: 4.12 10*3/UL (ref 2–8)
NEUTROPHILS NFR BLD AUTO: 62.9 % (ref 30–85)
NRBC CBCN: 0 % (ref 0–0.7)
OSMOLALITY SERPL CALC.SUM OF ELEC: 295 MOSM/KG (ref 273–304)
PHOSPHATE SERPL-MCNC: 2 MG/DL (ref 2.4–4.5)
PLATELET # BLD AUTO: 290 10*3/UL (ref 130–400)
PMV BLD AUTO: 11.1 FL (ref 9.4–12.4)
POTASSIUM SERPL-SCNC: 3.6 MMOL/L (ref 3.5–5.3)
RBC # BLD AUTO: 5.37 10*6/UL (ref 4.7–6.1)
SODIUM SERPL-SCNC: 142 MMOL/L (ref 135–147)
TRIGL SERPL-MCNC: 134 MG/DL (ref 40–150)
URATE SERPL-MCNC: 4.5 MG/DL (ref 3.5–8.5)
VLDLC SERPL-MCNC: 27 MG/DL (ref 5–37)
WBC # BLD AUTO: 6.56 10*3/UL (ref 4.8–10.8)

## 2019-12-05 ENCOUNTER — HOSPITAL ENCOUNTER (OUTPATIENT)
Dept: OTHER | Facility: HOSPITAL | Age: 69
Discharge: HOME | End: 2019-12-05

## 2019-12-05 LAB
ALBUMIN SERPL-MCNC: 4.2 G/DL (ref 3.5–5)
ALBUMIN/GLOB SERPL: 1.3 {RATIO} (ref 1.4–2.6)
ALP SERPL-CCNC: 99 U/L (ref 56–155)
ALT SERPL-CCNC: 21 U/L (ref 10–40)
ANION GAP SERPL CALC-SCNC: 19 MMOL/L (ref 8–19)
AST SERPL-CCNC: 18 U/L (ref 15–50)
BASOPHILS # BLD AUTO: 0.06 10*3/UL (ref 0–0.2)
BASOPHILS NFR BLD AUTO: 0.7 % (ref 0–3)
BILIRUB SERPL-MCNC: 0.34 MG/DL (ref 0.2–1.3)
BUN SERPL-MCNC: 19 MG/DL (ref 5–25)
BUN/CREAT SERPL: 13 {RATIO} (ref 6–20)
CALCIUM SERPL-MCNC: 9.9 MG/DL (ref 8.7–10.4)
CHLORIDE SERPL-SCNC: 103 MMOL/L (ref 99–111)
CHOLEST SERPL-MCNC: 118 MG/DL (ref 107–200)
CHOLEST/HDLC SERPL: 3.3 {RATIO} (ref 3–6)
CK SERPL-CCNC: 213 U/L (ref 57–374)
CONV ABS IMM GRAN: 0.02 10*3/UL (ref 0–0.2)
CONV CO2: 24 MMOL/L (ref 22–32)
CONV IMMATURE GRAN: 0.2 % (ref 0–1.8)
CONV TOTAL PROTEIN: 7.5 G/DL (ref 6.3–8.2)
CREAT UR-MCNC: 1.46 MG/DL (ref 0.7–1.2)
DEPRECATED RDW RBC AUTO: 42 FL (ref 35.1–43.9)
EOSINOPHIL # BLD AUTO: 0.18 10*3/UL (ref 0–0.7)
EOSINOPHIL # BLD AUTO: 2.2 % (ref 0–7)
ERYTHROCYTE [DISTWIDTH] IN BLOOD BY AUTOMATED COUNT: 14.3 % (ref 11.6–14.4)
GFR SERPLBLD BASED ON 1.73 SQ M-ARVRAT: 56 ML/MIN/{1.73_M2}
GLOBULIN UR ELPH-MCNC: 3.3 G/DL (ref 2–3.5)
GLUCOSE SERPL-MCNC: 131 MG/DL (ref 70–99)
HCT VFR BLD AUTO: 44.4 % (ref 42–52)
HDLC SERPL-MCNC: 36 MG/DL (ref 40–60)
HGB BLD-MCNC: 13.4 G/DL (ref 14–18)
LDLC SERPL CALC-MCNC: 46 MG/DL (ref 70–100)
LYMPHOCYTES # BLD AUTO: 1.58 10*3/UL (ref 1–5)
LYMPHOCYTES NFR BLD AUTO: 19.5 % (ref 20–45)
MAGNESIUM SERPL-MCNC: 1.57 MG/DL (ref 1.6–2.3)
MCH RBC QN AUTO: 24.9 PG (ref 27–31)
MCHC RBC AUTO-ENTMCNC: 30.2 G/DL (ref 33–37)
MCV RBC AUTO: 82.4 FL (ref 80–96)
MONOCYTES # BLD AUTO: 0.79 10*3/UL (ref 0.2–1.2)
MONOCYTES NFR BLD AUTO: 9.7 % (ref 3–10)
NEUTROPHILS # BLD AUTO: 5.48 10*3/UL (ref 2–8)
NEUTROPHILS NFR BLD AUTO: 67.7 % (ref 30–85)
NRBC CBCN: 0 % (ref 0–0.7)
OSMOLALITY SERPL CALC.SUM OF ELEC: 298 MOSM/KG (ref 273–304)
PHOSPHATE SERPL-MCNC: 3.5 MG/DL (ref 2.4–4.5)
PLATELET # BLD AUTO: 276 10*3/UL (ref 130–400)
PMV BLD AUTO: 10.6 FL (ref 9.4–12.4)
POTASSIUM SERPL-SCNC: 3.8 MMOL/L (ref 3.5–5.3)
RBC # BLD AUTO: 5.39 10*6/UL (ref 4.7–6.1)
SODIUM SERPL-SCNC: 142 MMOL/L (ref 135–147)
TRIGL SERPL-MCNC: 178 MG/DL (ref 40–150)
URATE SERPL-MCNC: 4.8 MG/DL (ref 3.5–8.5)
VLDLC SERPL-MCNC: 36 MG/DL (ref 5–37)
WBC # BLD AUTO: 8.11 10*3/UL (ref 4.8–10.8)

## 2020-02-07 ENCOUNTER — HOSPITAL ENCOUNTER (OUTPATIENT)
Dept: OTHER | Facility: HOSPITAL | Age: 70
Discharge: HOME | End: 2020-02-07

## 2020-02-07 LAB
ALBUMIN SERPL-MCNC: 4 G/DL (ref 3.5–5)
ALBUMIN/GLOB SERPL: 1.3 {RATIO} (ref 1.4–2.6)
ALP SERPL-CCNC: 89 U/L (ref 56–155)
ALT SERPL-CCNC: 22 U/L (ref 10–40)
ANION GAP SERPL CALC-SCNC: 19 MMOL/L (ref 8–19)
AST SERPL-CCNC: 19 U/L (ref 15–50)
BASOPHILS # BLD AUTO: 0.04 10*3/UL (ref 0–0.2)
BASOPHILS NFR BLD AUTO: 0.7 % (ref 0–3)
BILIRUB SERPL-MCNC: 0.31 MG/DL (ref 0.2–1.3)
BUN SERPL-MCNC: 12 MG/DL (ref 5–25)
BUN/CREAT SERPL: 9 {RATIO} (ref 6–20)
CALCIUM SERPL-MCNC: 9 MG/DL (ref 8.7–10.4)
CHLORIDE SERPL-SCNC: 104 MMOL/L (ref 99–111)
CHOLEST SERPL-MCNC: 112 MG/DL (ref 107–200)
CHOLEST/HDLC SERPL: 3.4 {RATIO} (ref 3–6)
CK SERPL-CCNC: 134 U/L (ref 57–374)
CONV ABS IMM GRAN: 0.02 10*3/UL (ref 0–0.2)
CONV CO2: 23 MMOL/L (ref 22–32)
CONV IMMATURE GRAN: 0.4 % (ref 0–1.8)
CONV TOTAL PROTEIN: 7.2 G/DL (ref 6.3–8.2)
CREAT UR-MCNC: 1.36 MG/DL (ref 0.7–1.2)
DEPRECATED RDW RBC AUTO: 44.4 FL (ref 35.1–43.9)
EOSINOPHIL # BLD AUTO: 0.14 10*3/UL (ref 0–0.7)
EOSINOPHIL # BLD AUTO: 2.5 % (ref 0–7)
ERYTHROCYTE [DISTWIDTH] IN BLOOD BY AUTOMATED COUNT: 14.9 % (ref 11.6–14.4)
EST. AVERAGE GLUCOSE BLD GHB EST-MCNC: 160 MG/DL
GFR SERPLBLD BASED ON 1.73 SQ M-ARVRAT: >60 ML/MIN/{1.73_M2}
GLOBULIN UR ELPH-MCNC: 3.2 G/DL (ref 2–3.5)
GLUCOSE SERPL-MCNC: 125 MG/DL (ref 70–99)
HBA1C MFR BLD: 7.2 % (ref 3.5–5.7)
HCT VFR BLD AUTO: 43.6 % (ref 42–52)
HDLC SERPL-MCNC: 33 MG/DL (ref 40–60)
HGB BLD-MCNC: 13.3 G/DL (ref 14–18)
LDLC SERPL CALC-MCNC: 51 MG/DL (ref 70–100)
LYMPHOCYTES # BLD AUTO: 1.31 10*3/UL (ref 1–5)
LYMPHOCYTES NFR BLD AUTO: 23.1 % (ref 20–45)
MAGNESIUM SERPL-MCNC: 1.23 MG/DL (ref 1.6–2.3)
MCH RBC QN AUTO: 25.1 PG (ref 27–31)
MCHC RBC AUTO-ENTMCNC: 30.5 G/DL (ref 33–37)
MCV RBC AUTO: 82.4 FL (ref 80–96)
MONOCYTES # BLD AUTO: 0.61 10*3/UL (ref 0.2–1.2)
MONOCYTES NFR BLD AUTO: 10.7 % (ref 3–10)
NEUTROPHILS # BLD AUTO: 3.56 10*3/UL (ref 2–8)
NEUTROPHILS NFR BLD AUTO: 62.6 % (ref 30–85)
NRBC CBCN: 0 % (ref 0–0.7)
OSMOLALITY SERPL CALC.SUM OF ELEC: 295 MOSM/KG (ref 273–304)
PHOSPHATE SERPL-MCNC: 2.3 MG/DL (ref 2.4–4.5)
PLATELET # BLD AUTO: 261 10*3/UL (ref 130–400)
PMV BLD AUTO: 10.6 FL (ref 9.4–12.4)
POTASSIUM SERPL-SCNC: 3.6 MMOL/L (ref 3.5–5.3)
RBC # BLD AUTO: 5.29 10*6/UL (ref 4.7–6.1)
SODIUM SERPL-SCNC: 142 MMOL/L (ref 135–147)
TRIGL SERPL-MCNC: 138 MG/DL (ref 40–150)
URATE SERPL-MCNC: 5 MG/DL (ref 3.5–8.5)
VLDLC SERPL-MCNC: 28 MG/DL (ref 5–37)
WBC # BLD AUTO: 5.68 10*3/UL (ref 4.8–10.8)

## 2020-03-05 ENCOUNTER — HOSPITAL ENCOUNTER (OUTPATIENT)
Dept: OTHER | Facility: HOSPITAL | Age: 70
Discharge: HOME | End: 2020-03-05

## 2020-03-05 LAB
ALBUMIN SERPL-MCNC: 4.4 G/DL (ref 3.5–5)
ALBUMIN/GLOB SERPL: 1.3 {RATIO} (ref 1.4–2.6)
ALP SERPL-CCNC: 96 U/L (ref 56–155)
ALT SERPL-CCNC: 18 U/L (ref 10–40)
ANION GAP SERPL CALC-SCNC: 18 MMOL/L (ref 8–19)
AST SERPL-CCNC: 16 U/L (ref 15–50)
BASOPHILS # BLD AUTO: 0.06 10*3/UL (ref 0–0.2)
BASOPHILS NFR BLD AUTO: 0.7 % (ref 0–3)
BILIRUB SERPL-MCNC: 0.3 MG/DL (ref 0.2–1.3)
BUN SERPL-MCNC: 17 MG/DL (ref 5–25)
BUN/CREAT SERPL: 10 {RATIO} (ref 6–20)
CALCIUM SERPL-MCNC: 9.6 MG/DL (ref 8.7–10.4)
CHLORIDE SERPL-SCNC: 105 MMOL/L (ref 99–111)
CHOLEST SERPL-MCNC: 123 MG/DL (ref 107–200)
CHOLEST/HDLC SERPL: 3.2 {RATIO} (ref 3–6)
CK SERPL-CCNC: 153 U/L (ref 57–374)
CONV ABS IMM GRAN: 0.03 10*3/UL (ref 0–0.2)
CONV CO2: 23 MMOL/L (ref 22–32)
CONV IMMATURE GRAN: 0.4 % (ref 0–1.8)
CONV TOTAL PROTEIN: 7.7 G/DL (ref 6.3–8.2)
CREAT UR-MCNC: 1.66 MG/DL (ref 0.7–1.2)
DEPRECATED RDW RBC AUTO: 45.1 FL (ref 35.1–43.9)
EOSINOPHIL # BLD AUTO: 0.13 10*3/UL (ref 0–0.7)
EOSINOPHIL # BLD AUTO: 1.6 % (ref 0–7)
ERYTHROCYTE [DISTWIDTH] IN BLOOD BY AUTOMATED COUNT: 14.9 % (ref 11.6–14.4)
GFR SERPLBLD BASED ON 1.73 SQ M-ARVRAT: 48 ML/MIN/{1.73_M2}
GLOBULIN UR ELPH-MCNC: 3.3 G/DL (ref 2–3.5)
GLUCOSE SERPL-MCNC: 121 MG/DL (ref 70–99)
HCT VFR BLD AUTO: 44.4 % (ref 42–52)
HDLC SERPL-MCNC: 39 MG/DL (ref 40–60)
HGB BLD-MCNC: 13.4 G/DL (ref 14–18)
LDLC SERPL CALC-MCNC: 56 MG/DL (ref 70–100)
LYMPHOCYTES # BLD AUTO: 1.21 10*3/UL (ref 1–5)
LYMPHOCYTES NFR BLD AUTO: 14.8 % (ref 20–45)
MAGNESIUM SERPL-MCNC: 1.7 MG/DL (ref 1.6–2.3)
MCH RBC QN AUTO: 25 PG (ref 27–31)
MCHC RBC AUTO-ENTMCNC: 30.2 G/DL (ref 33–37)
MCV RBC AUTO: 82.8 FL (ref 80–96)
MONOCYTES # BLD AUTO: 0.89 10*3/UL (ref 0.2–1.2)
MONOCYTES NFR BLD AUTO: 10.9 % (ref 3–10)
NEUTROPHILS # BLD AUTO: 5.87 10*3/UL (ref 2–8)
NEUTROPHILS NFR BLD AUTO: 71.6 % (ref 30–85)
NRBC CBCN: 0 % (ref 0–0.7)
OSMOLALITY SERPL CALC.SUM OF ELEC: 297 MOSM/KG (ref 273–304)
PHOSPHATE SERPL-MCNC: 2.5 MG/DL (ref 2.4–4.5)
PLATELET # BLD AUTO: 284 10*3/UL (ref 130–400)
PMV BLD AUTO: 11.2 FL (ref 9.4–12.4)
POTASSIUM SERPL-SCNC: 4.2 MMOL/L (ref 3.5–5.3)
RBC # BLD AUTO: 5.36 10*6/UL (ref 4.7–6.1)
SODIUM SERPL-SCNC: 142 MMOL/L (ref 135–147)
TRIGL SERPL-MCNC: 140 MG/DL (ref 40–150)
URATE SERPL-MCNC: 6 MG/DL (ref 3.5–8.5)
VLDLC SERPL-MCNC: 28 MG/DL (ref 5–37)
WBC # BLD AUTO: 8.19 10*3/UL (ref 4.8–10.8)

## 2020-09-22 ENCOUNTER — HOSPITAL ENCOUNTER (OUTPATIENT)
Dept: OTHER | Facility: HOSPITAL | Age: 70
Discharge: HOME | End: 2020-09-22
Attending: INTERNAL MEDICINE

## 2020-09-22 LAB
ALBUMIN SERPL-MCNC: 4.1 G/DL (ref 3.5–5)
ALBUMIN/GLOB SERPL: 1.3 {RATIO} (ref 1.4–2.6)
ALP SERPL-CCNC: 101 U/L (ref 56–155)
ALT SERPL-CCNC: 18 U/L (ref 10–40)
ANION GAP SERPL CALC-SCNC: 18 MMOL/L (ref 8–19)
AST SERPL-CCNC: 17 U/L (ref 15–50)
BASOPHILS # BLD AUTO: 0.04 10*3/UL (ref 0–0.2)
BASOPHILS # BLD: 0 % (ref 0–3)
BASOPHILS NFR BLD AUTO: 0.5 % (ref 0–3)
BILIRUB SERPL-MCNC: 0.34 MG/DL (ref 0.2–1.3)
BUN SERPL-MCNC: 13 MG/DL (ref 5–25)
BUN/CREAT SERPL: 8 {RATIO} (ref 6–20)
C3 FRG RBC-MCNC: SLIGHT
CALCIUM SERPL-MCNC: 9.2 MG/DL (ref 8.7–10.4)
CHLORIDE SERPL-SCNC: 105 MMOL/L (ref 99–111)
CHOLEST SERPL-MCNC: 130 MG/DL (ref 107–200)
CHOLEST/HDLC SERPL: 3.4 {RATIO} (ref 3–6)
CK SERPL-CCNC: 164 U/L (ref 57–374)
CLUMPED PLATELETS: ABNORMAL
CONV ABS BANDS: 0 % (ref 1–5)
CONV ABS IMM GRAN: 0.05 10*3/UL (ref 0–0.2)
CONV ANISOCYTES: SLIGHT
CONV CO2: 24 MMOL/L (ref 22–32)
CONV HYPOCHROMIA IN BLOOD BY LIGHT MICROSCOPY: SLIGHT
CONV IMMATURE GRAN: 0.6 % (ref 0–1.8)
CONV SEGMENTED NEUTROPHILS: 81 % (ref 45–70)
CONV TOTAL PROTEIN: 7.2 G/DL (ref 6.3–8.2)
CREAT UR-MCNC: 1.65 MG/DL (ref 0.7–1.2)
DEPRECATED RDW RBC AUTO: 41.5 FL (ref 35.1–43.9)
EOSINOPHIL # BLD AUTO: 0.15 10*3/UL (ref 0–0.7)
EOSINOPHIL # BLD AUTO: 1.8 % (ref 0–7)
EOSINOPHIL NFR BLD AUTO: 2 % (ref 0–7)
ERYTHROCYTE [DISTWIDTH] IN BLOOD BY AUTOMATED COUNT: 14.1 % (ref 11.6–14.4)
GFR SERPLBLD BASED ON 1.73 SQ M-ARVRAT: 48 ML/MIN/{1.73_M2}
GIANT PLATELETS: SLIGHT
GLOBULIN UR ELPH-MCNC: 3.1 G/DL (ref 2–3.5)
GLUCOSE SERPL-MCNC: 119 MG/DL (ref 70–99)
HCT VFR BLD AUTO: 48.6 % (ref 42–52)
HDLC SERPL-MCNC: 38 MG/DL (ref 40–60)
HGB BLD-MCNC: 15.2 G/DL (ref 14–18)
LARGE PLATELETS: SLIGHT
LDLC SERPL CALC-MCNC: 55 MG/DL (ref 70–100)
LYMPHOCYTES # BLD AUTO: 0.91 10*3/UL (ref 1–5)
LYMPHOCYTES NFR BLD AUTO: 10.8 % (ref 20–45)
MAGNESIUM SERPL-MCNC: 1.62 MG/DL (ref 1.6–2.3)
MCH RBC QN AUTO: 25.5 PG (ref 27–31)
MCHC RBC AUTO-ENTMCNC: 31.3 G/DL (ref 33–37)
MCV RBC AUTO: 81.7 FL (ref 80–96)
MICROCYTES BLD QL: SLIGHT
MONOCYTES # BLD AUTO: 0.9 10*3/UL (ref 0.2–1.2)
MONOCYTES NFR BLD AUTO: 10.7 % (ref 3–10)
MONOCYTES NFR BLD MANUAL: 5 % (ref 3–10)
NEUTROPHILS # BLD AUTO: 6.35 10*3/UL (ref 2–8)
NEUTROPHILS NFR BLD AUTO: 75.6 % (ref 30–85)
NRBC CBCN: 0 % (ref 0–0.7)
NUC CELL # PRT MANUAL: 0 /100{WBCS}
OSMOLALITY SERPL CALC.SUM OF ELEC: 297 MOSM/KG (ref 273–304)
PHOSPHATE SERPL-MCNC: 2.5 MG/DL (ref 2.4–4.5)
PLAT MORPH BLD: NORMAL
PLATELET # BLD AUTO: 274 10*3/UL (ref 130–400)
PMV BLD AUTO: 10.5 FL (ref 9.4–12.4)
POIKILOCYTOSIS BLD QL SMEAR: SLIGHT
POTASSIUM SERPL-SCNC: 3.8 MMOL/L (ref 3.5–5.3)
RBC # BLD AUTO: 5.95 10*6/UL (ref 4.7–6.1)
SMALL PLATELETS BLD QL SMEAR: ADEQUATE
SODIUM SERPL-SCNC: 143 MMOL/L (ref 135–147)
TRIGL SERPL-MCNC: 186 MG/DL (ref 40–150)
URATE SERPL-MCNC: 5.7 MG/DL (ref 3.5–8.5)
VARIANT LYMPHS NFR BLD MANUAL: 12 % (ref 20–45)
VLDLC SERPL-MCNC: 37 MG/DL (ref 5–37)
WBC # BLD AUTO: 8.4 10*3/UL (ref 4.8–10.8)

## 2020-09-24 LAB — TACROLIMUS BLD-MCNC: 5.5 NG/ML (ref 2–20)

## 2020-10-05 ENCOUNTER — OFFICE VISIT CONVERTED (OUTPATIENT)
Dept: PULMONOLOGY | Facility: CLINIC | Age: 70
End: 2020-10-05
Attending: INTERNAL MEDICINE

## 2020-10-06 ENCOUNTER — HOSPITAL ENCOUNTER (OUTPATIENT)
Dept: PET IMAGING | Facility: HOSPITAL | Age: 70
Discharge: HOME | End: 2020-10-06
Attending: INTERNAL MEDICINE

## 2020-10-07 ENCOUNTER — HOSPITAL ENCOUNTER (OUTPATIENT)
Dept: MRI IMAGING | Facility: HOSPITAL | Age: 70
Discharge: HOME | End: 2020-10-07
Attending: INTERNAL MEDICINE

## 2020-10-08 ENCOUNTER — HOSPITAL ENCOUNTER (OUTPATIENT)
Dept: RADIATION ONCOLOGY | Facility: HOSPITAL | Age: 70
Setting detail: RECURRING SERIES
Discharge: HOME | End: 2020-11-11
Attending: RADIOLOGY

## 2020-10-09 ENCOUNTER — HOSPITAL ENCOUNTER (OUTPATIENT)
Dept: INFUSION THERAPY | Facility: HOSPITAL | Age: 70
Discharge: HOME | End: 2020-10-09
Attending: INTERNAL MEDICINE

## 2020-10-12 ENCOUNTER — OFFICE VISIT CONVERTED (OUTPATIENT)
Dept: PULMONOLOGY | Facility: CLINIC | Age: 70
End: 2020-10-12
Attending: INTERNAL MEDICINE

## 2020-10-12 ENCOUNTER — HOSPITAL ENCOUNTER (OUTPATIENT)
Dept: ONCOLOGY | Facility: HOSPITAL | Age: 70
Discharge: HOME | End: 2020-10-12
Attending: INTERNAL MEDICINE

## 2020-10-15 ENCOUNTER — HOSPITAL ENCOUNTER (OUTPATIENT)
Dept: OTHER | Facility: HOSPITAL | Age: 70
Discharge: HOME | End: 2020-10-15
Attending: INTERNAL MEDICINE

## 2020-10-21 ENCOUNTER — OFFICE VISIT CONVERTED (OUTPATIENT)
Dept: PULMONOLOGY | Facility: CLINIC | Age: 70
End: 2020-10-21
Attending: INTERNAL MEDICINE

## 2020-10-21 ENCOUNTER — HOSPITAL ENCOUNTER (OUTPATIENT)
Dept: LAB | Facility: HOSPITAL | Age: 70
Discharge: HOME | End: 2020-10-21
Attending: INTERNAL MEDICINE

## 2020-10-23 LAB — BACTERIA SPEC AEROBE CULT: NORMAL

## 2021-05-28 VITALS
HEIGHT: 70 IN | OXYGEN SATURATION: 90 % | HEART RATE: 125 BPM | HEART RATE: 94 BPM | WEIGHT: 206.37 LBS | BODY MASS INDEX: 28.71 KG/M2 | RESPIRATION RATE: 20 BRPM | TEMPERATURE: 98.2 F | OXYGEN SATURATION: 94 % | RESPIRATION RATE: 16 BRPM | WEIGHT: 200.5 LBS | DIASTOLIC BLOOD PRESSURE: 80 MMHG | SYSTOLIC BLOOD PRESSURE: 130 MMHG | BODY MASS INDEX: 29.54 KG/M2 | HEIGHT: 70 IN | TEMPERATURE: 97 F | SYSTOLIC BLOOD PRESSURE: 126 MMHG | DIASTOLIC BLOOD PRESSURE: 74 MMHG

## 2021-05-28 VITALS
RESPIRATION RATE: 20 BRPM | SYSTOLIC BLOOD PRESSURE: 138 MMHG | DIASTOLIC BLOOD PRESSURE: 83 MMHG | BODY MASS INDEX: 29.26 KG/M2 | WEIGHT: 204.37 LBS | OXYGEN SATURATION: 93 % | HEART RATE: 97 BPM | HEIGHT: 70 IN | TEMPERATURE: 97.1 F

## 2021-05-28 NOTE — PROGRESS NOTES
Patient: LUZ ELENA XIAO     Acct: [id number]     Report: #[id number]  UNIT #: [id number]     : 1950    Encounter Date:10/05/2020  PRIMARY CARE: HERACLIO LANTIGUA  ***Signed***  --------------------------------------------------------------------------------------------------------------------  Chief Complaint      Encounter Date      Oct 5, 2020            Primary Care Provider      HERACLIO LANTIGUA            Referring Provider      HERACLIO LANTIGUA            Patient Complaint      Patient is complaining of      bronch follow up/soa            VITALS      Height 5 ft 10.00 in / 177.8 cm      Weight 206 lbs 6.000 oz / 93.218232 kg      BSA 2.12 m2      BMI 29.6 kg/m2      Temperature 98.2 F / 36.78 C - Temporal      Pulse 94      Respirations 16      Blood Pressure 130/80 Sitting, Right Arm      Pulse Oximetry 94%, nasal cannula , 2 lpm            HPI      The patient is a very pleasant 70 year old -American gentleman former     cigarette smoker who quit smoking 8 years ago after an extensive history,     history of cardiac transplantation at [facility] in  who is on     mycophenolate and tacrolimus for immunosuppression here for follow up test     results on hospitalization.  The patient was hospitalized last week with acute     hypoxemic respiratory failure, pneumonia, questionable right hilar mass.  The     patient had a thoracentesis done with 1.6 liters of exudative fluid removed that    appeared consistent with parapneumonic effusion, however pathology was     consistent with adenocarcinoma of the lung.  Bronchoscopy was done showing     endobronchial lesion and right hilar mass of which pathology is pending, but     better likely going to be consistent with adenocarcinoma.  Since being ho    spitalized he has completed a course of antibiotics. He is currently on 2 liters    per minute of oxygen. His orthopnea has resolved. Since doing thoracentesis     dyspnea is much better. He can now walk  around the house about 500 feet before     having to stop because of shortness of breath.  Previously he could barely walk     before feeling like he was smothering.  Dyspnea is moderate in severity, worse     with exertion and relieved with rest. He has an intermittent dry hacking cough     which is unchanged.  He denies any wheezing, headaches, chest pain, weight loss     or hemoptysis.  Denies any nausea, vomiting, fevers, chills, headaches, chest     pain or hemoptysis.  He has been in touch with the [facility] and we will need to coordinate with them for test results.  He    is able to perform ADLs without difficulty.            I have personally reviewed the review of systems, past family, social, surgical     and medical histories and I agree with the findings.            ROS      Constitutional:  Complains of: Fatigue; Denies: Fever, Weight gain, Weight loss,    Chills, Insomnia, Other      Respiratory/Breathing:  Complains of: Shortness of air, Wheezing, Cough; Denies:    Hemoptysis, Pleuritic pain, Other      Endocrine:  Denies: Polydipsia, Polyuria, Heat/cold intolerance, Diabetes, Other      Eyes:  Denies: Blurred vision, Vision Changes, Other      Ears, nose, mouth, throat:  Denies: Mouth lesions, Thrush, Throat pain,     Hoarseness, Allergies/Hay Fever, Post Nasal Drip, Headaches, Recent Head Injury,    Nose Bleeding, Neck Stiffness, Thyroid Mass, Hearing Loss, Ear Fullness, Dry     Mouth, Nasal or Sinus Pain, Dry Lips, Nasal discharge, Nasal congestion, Other      Cardiovascular:  Denies: Palpitations, Syncope, Claudication, Chest Pain, Wake     up Gasping for air, Leg Swelling, Irregular Heart Rate, Cyanosis, Dyspnea on     Exertion, Other      Gastrointestinal:  Denies: Nausea, Constipation, Diarrhea, Abdominal pain,     Vomiting, Difficulty Swallowing, Reflux/Heartburn, Dysphagia, Jaundice,     Bloating, Melena, Bloody stools, Other      Genitourinary:  Denies:  Urinary frequency, Incontinence, Hematuria, Urgency,     Nocturia, Dysuria, Testicular problems, Other      Musculoskeletal:  Denies: Joint Pain, Joint Stiffness, Joint Swelling, Myalgias,    Other      Hematologic/lymphatic:  DENIES: Lymphadenopathy, Bruising, Bleeding tendencies,     Other      Neurological:  Denies: Headache, Numbness, Weakness, Seizures, Other      Psychiatric:  Denies: Anxiety, Appropriate Effect, Depression, Other      Sleep:  No: Excessive daytime sleep, Morning Headache?, Snoring, Insomnia?, Stop    breathing at sleep?, Other      Integumentary:  Denies: Rash, Dry skin, Skin Warm to Touch, Other      Immunologic/Allergic:  Denies: Latex allergy, Seasonal allergies, Asthma, U    rticaria, Eczema, Other      Immunization status:  No: Up to date            FAMILY/SOCIAL/MEDICAL HX      Surgical History:  Yes: Bladder Surgery (TURP), Bowel Surgery (rectal prolapse     repair); No: AAA Repair, Abdominal Surgery, Adenoids, Angioplasty, Appendectomy,    Back Surgery, Breast Surgery, CABG, Carotid Stenosis, Cholecystectomy, Ear     Surgery, Eye Surgery, Head Surgery, Hernia Surgery, Kidney Surgery, Nose     Surgery, Oral Surgery, Orthopedic Surgery, Prostatectomy, Rectal Surgery, Spinal    Surgery, Testicular Surgery, Throat Surgery, Tonsils, Valve Replacement,     Vascular Surgery, Other Surgeries      Is Father Still Living?:  No      Is Mother Still Living?:  No       Family History:  Yes      Social History:  No Tobacco Use, No Alcohol Use, No Recreational Drug use      Smoking status:  Former smoker (STARTED AT AGE 17 AND QUIT IN 2013)      Medical History:  Yes: Congestive Heart Failu (Prior to transplant), Diabetes     (is watched closely due to med. induced Diabetes), High Blood Pressure; No:     Alcoholism, Allergies, Anemia, Arthritis, Asthma, Atrial Fibrillation, Blood     Disease, Broken Bones, Cataracts, Chemical Dependency, Chemotherapy/Cancer,     Chronic Bronchitis/COPD, Emphysema,  Chronic Liver Disease, Colon Trouble,     Colitis, Diverticulitis, Deafness or Ringing Ears, Convulsions, Depression,     Anxiety, Bipolar Disorder, PTSD, Epilepsy, Seizures, Forgetfullness, Glaucoma,     Gall Stones, Gout, Head Injury, Heart Attack, Heart Murmur, GERD, Hemorrhoids/R    ectal Prob, Hepatitis, Hiatal Hernia, High Cholesterol, HIV (Do not ask - volu,     Jaundice, Kidney or Bladder Disease, Kidney Stones, Migrane Headaches, Mitral     Valve Prolapse, Night sweats, Phlebitis, Psychiatric Care, Reflux Disease,     Rheumatic Fever, Sexually Transmitted Dis, Shortness Of Breath, Sinus Trouble,     Skin Disease/Psoriais/Ecz, Stroke, Thyroid Problem, Tuberculosis or Pos TB Te,     Miscellaneous Medical/oth      Psychiatric History      none            PREVENTION      Hx Influenza Vaccination:  No      Influenza Vaccine Declined:  Yes      2 or More Falls in Past Year?:  No      Fall Past Year with Injury?:  No      Hx Pneumococcal Vaccination:  No      Encouraged to follow-up with:  PCP regarding preventative exams.      Chart initiated by      ronald oliveros ma            ALLERGIES/MEDICATIONS      Allergies:        Coded Allergies:             SULFAMETHOXAZOLE (Verified  Allergy, Severe, HIVES, 10/5/20)           TRIMETHOPRIM (Verified  Allergy, Severe, HIVES, 10/5/20)      Uncoded Allergies:             LISNOPRIL (Allergy, Severe, TONGUE AND LIPS SWELL, 9/28/20)      Medications    Last Reconciled on 10/5/20 09:04 by YAHIR CHEN MD      Amoxicillin/Clavulanate K (Augmentin 875/125 Mg) 1 Each Tablet      875 MG PO BID for 6 Days, #12 TAB 0 Refills         Prov: SHIVA BARRAZA         10/1/20       Albuterol/Ipratropium (Duoneb) 3 Ml Ampul.neb      3 ML INH RTQ6H WA for 30 Days, #90 NEB         Prov: SHIVA BARRAZA         10/1/20       Potassium Chloride (K-Dur*) 20 Meq Tab.er.prt      40 MEQ PO HS, #30 TAB 0 Refills         Reported         9/29/20       Potassium Chloride (K-Dur*) 20 Meq Tab.er.prt      60  MEQ PO QAM, #30 TAB 0 Refills         Reported         9/29/20       (Amox/Cla)   No Conflict Check      875 MG PO BIDAC         Reported         9/29/20       Mycophenolate Mofetil (Mycophenolate Mofetil) 250 Mg Capsule      1000 MG PO BIDAC, #240 CAP 0 Refills         Reported         9/29/20       Sertraline HCl (Sertraline*) 50 Mg Tablet      50 MG PO QDAY, #30 TAB 0 Refills         Reported         9/29/20       Gabapentin (Gabapentin) 300 Mg Capsule      300 MG PO HS, #30 CAP 0 Refills         Reported         9/28/20       (sitagliptin/metform)   No Conflict Check       MG PO QDAY         Reported         9/28/20       Atorvastatin (Atorvastatin) 40 Mg Tablet      40 MG PO HS, #30 TAB 0 Refills         Reported         9/28/20       Imipramine HCl (Imipramine HCl) 50 Mg Tablet      50 MG PO HS, #30 TAB 0 Refills         Reported         9/28/20       Loratadine/Pseudoephedrine 10/240 MG (CLARITIN-D 24 HOUR TABLET) 1 Each     Tab.er.24h      1 TAB PO QDAY, TAB         Reported         9/28/20       Multivitamin (Centrum Silver*) 1 Tab Tab      1 EA PO QDAY, TAB         Reported         7/10/13       amLODIPine (amLODIPine) 5 Mg Tablet      5 MG PO QAM         Reported         7/9/13       Magnesium Oxide (Magnesium Oxide) 400 Mg Capsule      400 MG PO TID         Reported         7/9/13       Tacrolimus (Prograf) 1 Mg Cap      2 MG PO BID         Reported         7/9/13      Current Medications      Current Medications Reviewed 10/5/20            EXAM      Vital Signs Reviewed.      General:  WDWN, Alert, NAD.      HEENT: PERRL, EOMI.  OP, nares clear, no sinus tenderness.      Neck: Supple, no JVD, no thyromegaly.      Lymph: No axillary, cervical, supraclavicular lymphadenopathy noted bilaterally.      Chest: Good aeration, right base rhonchi, diminished right chest, dull to     percussion right chest, no work of breathing noted.        CV: RRR, no MGR, pulses 2+, equal.        Abd: Soft, NT, ND, +BS,  no HSM.      EXT: No clubbing, no cyanosis, no edema, no joint tenderness.        Neuro:  A  Skin: No rashes or lesions.      Vtials      Vitals:             Height 5 ft 10.00 in / 177.8 cm           Weight 206 lbs 6.000 oz / 93.751987 kg           BSA 2.12 m2           BMI 29.6 kg/m2           Temperature 98.2 F / 36.78 C - Temporal           Pulse 94           Respirations 16           Blood Pressure 130/80 Sitting, Right Arm           Pulse Oximetry 94%, nasal cannula , 2 lpm            REVIEW      Results Reviewed      PCCS Results Reviewed?:  Yes Prev Lab Results, Yes Prev Radiology Results, Yes     Previous [facility] Records      Lab Results      I personally reviewed his hospital records including our consult note and     discharge summary. I also personally reviewed my bronchoscopy note and tho    racentesis note.  His pleural fluid pathology is consistent with adenocarcinoma     of the lung. Bronchoscopy cultures were unremarkable.  Pleural fluid analysis     yielded a neutrophil predominant exudate. I personally reviewed a chest CT in     2020 as well as multiple chest x-rays.              Patient: LUZ ELENA XIAO   Acct #: [id number]         : 1950   Report #: [id number]      MR #: [id number]   Location: [address] [place], [place] [address]                              ***Signed***      Progress Note      DATE: 10/1/20      Brief Hospital Summary      Follow-up for pneumonia            Bronchoscopy biopsies result pending      Pleural fluid was consistent with parapneumonic effusion exudative      Down to 2 L/min of oxygen      Cough decreased.  Productive of thin yellow sputum      Dyspnea on activity improved      No wheezing      No chest pain or hemoptysis      No nausea, fevers or chills            Review of Systems      General: Denied complaints      Respiratory: Shortness of breath, cough,; otherwise denied complaints      Cardiovascular: BORDEN, orthopnea; otherwise denied complaints      GI: Denied  complaints            Exam      Vital Signs Reviewed      General: WDWN, Alert, NAD.        HEENT:  PERRL, EOMI.  OP, nares clear      Chest:  good aeration, tympanic to percussion bilaterally, no work of breathing     noted, decreasing right base rhonchi and crackles,      CV: RRR, no MGR, pulses 2+, equal.      Abd:  Soft, NT, ND, + BS, no HSM      Extremities:  no clubbing, no cyanosis, no edema      Neuro:  A  Skin: No rashes or lesions noted            Exam            Vital Signs              Date Time  Temp Pulse Resp B/P (MAP) Pulse Ox O2 Delivery O2 Flow Rate FiO2             10/1/20 14:34     92 Nasal Cannula               10/1/20 11:22 99.0 117 16 118/86 (94)   2.00             I           9/30/20 10/1/20             19:00 07:00             Intake Total 1035 ml 198 ml             Output Total 300 ml 800 ml             Balance 735 ml -602 ml                            Intake Oral 360 ml              IV Total 675 ml 198 ml             Output Urine Total 300 ml 800 ml             # Voids 1 3             # Bowel Movements  2            Lab Results      Laboratory Tests      10/1/20 06:33            Current Medications      Current Medications Reviewed      Impression            ASSESSMENT      Acute hypoxemic respiratory failure      Moderate to large right pleural effusion      Community-acquired pneumonia from unspecified organism      Mediastinal lymphadenopathy reactive vs malignant      Question lung mass      Shortness of breath secondary to the above      Bilateral small upper lobe nodules      Cardiomyopathy status post heart transplant 2013 on chronic immunosuppressive     therapy      Immunosuppressed status      Hypertension      GERD      NIDDM            PLAN      Pleural effusion consistent with parapneumonic effusion.  Follow-up on cultures     and pathology      Bronchoscopy pathology pending.  Cultures pending as well      Wean O2 to keep SPO2 greater than 90%.  Walk test for home O2      Can  de-escalate antibiotics to  Augmentin to complete 7 days of discharge      Continue nebulizers and bronchopulmonary hygiene      Continue home immunosuppression      Give 7 days of prednisone 40 mg given high eosinophils noted on BAL            Patient can discharge from my perspective      See us in office next week for test results            Labs, radiology, microbiology and provider notes were personally reviewed      Discussed with primary      Problems:        (1) Pneumonia      Orders            Orders - YAHIR CHEN      Wean Off O2-Sat 90% Or Greater (10/1/20 06:39)      Prednisone (Prednisone) (10/1/20 09:00)      Regular Diet (10/1/20 Breakfast)            YAHIR CHEN                   Oct 1, 2020 06:40               <Electronically signed by YAHIR CHEN MD>  10/01/20 1518      Radiographic Results               [facility]                PACS RADIOLOGY REPORT            Patient: LUZ ELENA XIAO   Acct: #[id number]   Report: #[id number]            UNIT #: [id number]    DOS: 20 1324      INSURANCE:MEDICARE PART A   LOCATION:[address] [place] [address]   : 1950            PROVIDERS      ADMITTING:  SHIVA BARRAZA   ATTENDING: SHIVA BARRAZA      FAMILY:  HERACLIO LANTIGUA   ORDERING:  YAHIR CHEN         OTHER:    DICTATING:  ERIKA GORMAN MD            REQ #:[id number]   EXAM:CXR1 - CHEST 1 View AP PA      REASON FOR EXAM:  POST right THORACENTESIS      REASON FOR VISIT:  PNEUMONIA,HYPOXIA            *******Signed******         PROCEDURE:   CHEST AP/PA SINGLE VIEW             COMPARISON:   [facility], CR, CHEST AP/PA 1 VIEW, 2020,     15:40.  [facility], CT, CHEST W/O CONTRAST, 2020, 7:11.  [facility],     CR, CHEST DECUBITUS 1       VIEW RIGHT, 2020, 20:34.             INDICATIONS:   POST RIGHT THORACENTESIS             FINDINGS:         Stable masslike area of consolidation at the right  hilum.  There has been     significant interval       reduction in right-sided pleural effusion.  There is no evidence of a visible     pleural effusion.  No       pneumothorax.  There are interstitial opacities in the periphery of the right     mid lung and within       the left mid lung and left lung base.  There is evidence of prior median     sternotomy.  No new lung       opacity.             CONCLUSION:         1. Interval thoracentesis with evacuation of right-sided pleural effusion.      2. Stable masslike consolidation at the right hilum with emanating interstitial     opacities.      3. Chronic lung disease.              ERIKA GORMAN MD             Electronically Signed and Approved By: ERIKA GORMAN MD on 9/29/2020 at 14:20                                  Until signed, this is an unconfirmed preliminary report that may contain      errors and is subject to change.                                              ALTD1:      D:09/29/20 1420            Assessment      Adenocarcinoma, lung         Adenocarcinoma of right lung - C34.91         Laterality: right            Notes      Discontinued Medications      * predniSONE 20 MG TABLET: 40 MG PO QDAY 3 Days #6      New Diagnostics      * PET [facility] INI NETSP, As Soon As Possible         Dx: Adenocarcinoma, lung - C34.90      * Brain MRI W/ Cont, As Soon As Possible         Dx: Adenocarcinoma, lung - C34.90      * Brain W/WO Cont MRI, SCHEDULED PROCEDURE         Dx: Adenocarcinoma of right lung - C34.91      New Referrals      * Oncology, As Soon As Possible         [facility]         Dx: Adenocarcinoma, lung - C34.90      IMPRESSION:      1.  Adenocarcinoma of the lung, stage 4 pleural fluid positive.      2.  Right sided malignant pleural effusion.         3. Endobronchial lesion right lung with pathology pending, likely consistent     with adenocarcinoma.      4. Postobstructive pneumonia, resolving.      5. Acute hypoxemic  respiratory failure, persistent and down 2 liters per minute     of oxygen.      6. Dyspnea, improved.      7. Orthopnea, resolved.      8. Tobacco abuse with cigarettes in remission.      9. Immunosuppressed status on Prograf and CellCept.      10. History of cardiac transplantation in 2013.      11. Tobacco abuse with cigarettes in remission, quit smoking 8 years ago.              PLAN:      1.  Discussed with patient that he has stage 4 adenocarcinoma of the lung.      Bronchoscopy pathology results pending.      2.  We will send for full PET CT and brain MRI with contrast to complete     staging, as we already know it is stage IV, but we need to know the rest of     metastatic areas.      3. Send patient to [facility]     asa.      4. We will fax all records and reach out to his heart transplant coordinator at     [facility] to notify them of his stage IV lung cancer diagnosis and     impending treatment.      5. Continue 2 liters of oxygen to keep SPO2 greater than 90%.      6. Complete course of antibiotics for postobstructive pneumonia.      7.  Up-to-date with his Fluzone, Prevnar and Pneumovax.      8. We will have patient to see me Friday as an outpatient to reassess the     pleural space. He may need a thoracentesis. Also may need a pleurx catheter     depending on how quickly pleural fluid reaccumulates. The risks and benefits     were discussed with patient and he is willing to undergo procedure.      9.  We will have patient follow up with us in 3-4 months.            Patient Education      Patient Education Provided:  Lung Cancer            Procedure Orders      Get Consent signed for:        Please obtain consent for diagnostic and therapeutic ultrasound guided      thoracentesis.      Risks and Benefits:        I have discussed the risks of the procedure with the patient including      pneumothorax, hemothorax, bleeding, hypoxia and death.  The  patient      recognizes these findings, acknowledges these findings and is agreeable to      the procedure.            Electronically signed by YAHIR CHEN  10/05/2020 16:04       Disclaimer: Converted document may not contain table formatting or lab diagrams. Please see [employer] for the authenticated document.

## 2021-05-28 NOTE — PROGRESS NOTES
Patient: LUZ ELENA XIAO     Acct: [id number]     Report: #[id number]  UNIT #: [id number]     : 1950    Encounter Date:10/12/2020  PRIMARY CARE: HERACLIO LANTIGUA  ***Signed***  --------------------------------------------------------------------------------------------------------------------  NURSE INTAKE      Visit Type      New Patient Visit            Chief Complaint      r lung ca      Intent of Therapy:  Palliative            Referring Provider/Copies To      Referring Provider:  YAHIR CHEN      Primary Care Provider:  HERACLIO LANTIGUA      Copies To:   HERACLIO LANTIGUA; YULIA JACOBS            History and Present Illness      Past Oncology Illness History      Patient presents to the clinic for discussion of treatment options of his newly     diagnosed adenocarcinoma of his right lung. Patient reports that he presented to    the ED on 20 with shortness of air and cough. Thoracentesis of right lung     was performed by Dr. Chen which resulted in adenocarcinoma. Patient reports     that he immediately had relief with the thoracentesis. He was diagnosed with     pneumonia and started on antibiotics. He was discharged from the hospital on     home O2.            HPI - Oncology Interim      Patient presents today for evaluation of recently diagnosed adenocarcinoma the     right lung.  Of note he is a heart transplant recipient in  and is on     mycophenolate and Prograf.  He states approximately a month ago he began     noticing some mild shortness of breath which rapidly progressed.  He presented     to the emergency room on 2020 and was admitted to the hospital for new ple    ural effusion and shortness of breath.  He underwent thoracentesis and cytology     was positive for adenocarcinoma.  He underwent bronchoscopy during that     hospitalization as well also demonstrating adenocarcinoma in the right lung and     sampled lymph nodes.  He was placed on supplemental oxygen.  He was  eventually     discharged on antibiotics for pneumonia.  He completed the antibiotics last     week.  He noted increased shortness of breath again last week and went a repeat     thoracentesis last Friday.  He does state that he felt better for a day or 2 but    his symptoms have now returned.  He reports cough which is productive of     greenish-yellow sputum.  He states that he has had blood in the sputum since his    bronchoscopy with biopsy.  He denies any other unusual aches or pains.  He is     able to perform his ADLs but gets very winded while doing so.  He denies any     unusual aches or pains.  No new masses or lymphadenopathy.            Cancer Details            Right lung.  Adenocarcinoma.            Clinical Staging      T2, N2, M1 = stage IV            Clinical Trial Participant      No            ECOG Performance Status      2            Most Recent Lab Findings      Laboratory Tests      20 05:44            10/1/20 06:33            Laboratory Tests            Test       10/1/20      06:33             Magnesium Level       1.85 mg/dL      (1.60-2.30)            Most Recent Imaging Findings      Patient: LUZ ELENA XIAO   Acct: #[id number]   Report: #[id number]            UNIT #: [id number]    DOS: 10/07/20 1009      INSURANCE:MEDICARE PART A   LOCATION:MRI     : 1950            PROVIDERS      ADMITTING:     ATTENDING: YAHIR CHEN      FAMILY:  HERACLIO LANTIGUA   ORDERING:  YAHIR CHEN         OTHER:    DICTATING:  Tyree Medina MD            REQ #:[id number]   EXAM:[facility] - MRI BRAIN w wo CONTRAST      REASON FOR EXAM:  MALIGNANT NEOPLASM OF RIGHT LUNG      REASON FOR VISIT:  MALIGNANT NEOPLASM OF RIGHT LUNG            *******Signed******         PROCEDURE:   MRI BRAIN WITHOUT AND WITH CONTRAST             COMPARISON:   None.             INDICATIONS:   RECENTLY DIAGNOSED WITH LUNG CANCER. EVALUATE FOR BRAIN METS.             CONTRAST:   20ML  Multihance I.V.              TECHNIQUE:   A variety of imaging planes and parameters were utilized for     visualization of suspected       pathology.  Images were performed without and with gadolinium contrast.             FINDINGS:         With the diffusion-weighted images demonstrate no acute infarcts.  No abnormal     extra-axial fluid       collection is seen.  No evidence of intracranial hemorrhage.  There is no mass,     mass effect,       hydrocephalus.  The major intracranial vascular flow voids are preserved.  There    is mild       generalized parenchymal volume loss.  There is some minimal patchy increased T2     signal within the       periventricular white matter bilaterally compatible with chronic ischemic     change.             Post-contrast images demonstrate no pathologic contrast enhancement.             Globes and orbits are within normal limits.  There is a polyp or mucous     retention cyst within the       left maxillary sinus.  Other visualized paranasal sinuses and mastoid air cells     are clear.             CONCLUSION:         1. Mild generalized parenchymal volume loss and changes of chronic small vessel     ischemic disease.       2.  No pathologic intracranial contrast enhancement to suggest metastatic     disease.                NONI SOSA MD             Electronically Signed and Approved By: NONI SOSA MD on 10/07/2020 at 11:32                                  Until signed, this is an unconfirmed preliminary report that may contain      errors and is subject to change.                            Patient: LUZ ELENA XIAO   Acct: #[id number]   Report: #[id number]            UNIT #: [id number]    DOS: 10/06/20 1413      INSURANCE:MEDICARE PART A   LOCATION:PET     : 1950            PROVIDERS      ADMITTING:     ATTENDING: YAHIR CHEN      FAMILY:  HERACLIO LANTIGUA   ORDERING:  YAHIR CHEN         OTHER:    DICTATING:  Compa Canseco MD            REQ #:[id number]   EXAM:ISKBSMIDTH - SKULL  BASE-MIDTHIGH INIT fdg      REASON FOR EXAM:  C34.01      REASON FOR VISIT:  C34.01            *******Signed******         PROCEDURE:   PET CT SKULL BASE TO MID THIGH INITIAL             COMPARISON:   [facility], CT, CHEST W/O CONTRAST, 9/29/2020, 7:11.     [facility], CR, CHEST AP/PA 1 VIEW, 9/29/2020, 13:40.             INDICATIONS:   C34.01             TECHNIQUE:   After obtaining the patient's consent, F-18 FDG was administered     intravenously.  A PET       scan with concurrent CT imaging was performed from the skull to the thigh with     multiplanar imaging.             RADIONUCLIDE:     11.79 MCI   F18 FDG- I.V.      LABS:                          Blood Glucose 126 mg/dl      UPTAKE TIME:        56 mins      BACKGROUND UPTAKE:  Mediastinal background 3.1 SUV.  Hepatic background 3.7 SUV.             FINDINGS:         Physiologic uptake is seen in the head and neck.             Ill-defined hypermetabolic mass in the right hilum measures 4 cm in greatest     dimension.  SUV max       7.8.  Confluent adenopathy extends along the anterior right mainstem bronchus     into the right       paratracheal and precarinal region.  Index precarinal lymph node seen on image     74 measures 2.3 cm x       1.8 cm.  SUV max 8.4.  Index mid right paratracheal lymph node seen on image 69     measures 2.1 cm x       1.6 cm.  SUV max 10.2.  At least 3 lymph nodes in the high paratracheal region     measure up to 10 mm       in greatest short axis dimension.  SUV max 6.3.             Ill-defined uptake is seen in alveolar airspace disease in the right upper lobe     adjacent to the       ill-defined mass.  SUV max 3.7.  Abnormal uptake is seen in airspace disease in     the medial right       middle lobe.  SUV max 6.2.             Subtle fullness is seen in the right adrenal gland.  SUV max 5.2.             A large lytic lesion occupies much of the central portion of the T1 vertebral     body,  measuring 1.8       cm in diameter.  SUV max 7.9.  A 1 cm lytic lesion is seen in the anterior right    1st rib, which is       also intensely hypermetabolic.  SUV max 13.5.  Ill-defined increased activity in    the right scapula       is nonspecific.  7 mm lytic lesion in the right ilium is hypermetabolic.  SUV     max 7.6.  Subtle       increased activity is seen in the remainder of the skeleton.             CONTINUED ON NEXT PAGE...             CONCLUSION:         PET-CT demonstrating findings consistent with advanced bronchogenic malignancy,     a right hilar mass       is evident, with extensive mediastinal adenopathy.             Bony metastatic disease is evident, with a large destructive lesion occupying     the central portion       of the T1 vertebral body, with smaller lesions in the right 1st rib and right     ilium.  Diffuse       abnormal skeletal activity is evident.                KEYUR CAMPOS MD             Electronically Signed and Approved By: KEYUR CAMPOS MD on 10/06/2020 at 16:09                                  Until signed, this is an unconfirmed preliminary report that may contain      errors and is subject to change.                                              COUST:      D:10/06/20 1609            PAST, FAMILY   Past Medical History      Other PMH:        lung ca, CHF      Hematology/Oncology (M):  Lung Cancer (right)            Past Surgical History      Lung Biopsy (right)            heart transplant, turp, rectal prolapse            Family History      Family History:  Breast Cancer (sister), Oral Cancer (father)            Father             Social History      Marital Status:        Lives independently:  Yes      Number of Children:  0      Occupation:  retired            Tobacco Use      Tobacco status:  Former smoker (STARTED AT AGE 17 AND QUIT IN )      Currently Vaping:  No      Smoking history:  25-50 pack years            Alcohol Use      Alcohol intake:   red wine occasionally            Substance Use      Substance use:  Denies use            Additional  Information      Additional History:        stopped smoking 9/2012.            REVIEW OF SYSTEMS      General:  Denies: Fatigue, Fever, Night Sweats, Weight Loss      Eye:  Denies Vision Changes      ENT:  Denies Headache, Denies Hoarseness      Cardiovascular:  Denies Chest Pain, Denies Palpitations      Respiratory:  Admits: Coughing Blood, Productive Cough, Shortness of Air      Gastrointestinal:  Admits Diarrhea; Denies Nausea/Vomiting      Genitourinary:  Denies Blood in Urine, Denies Incontinence      Musculoskeletal:  Denies Back Pain, Denies Muscle Pain      Neurologic:  Denies Numbness\Tingling      Psychiatric:  Denies Anxiety, Denies Depression      Hematologic/Lymphatic:  Denies Bruising, Denies Bleeding, Denies Enlarged Lymph     Nodes            VITAL SIGNS AND SCORES      Vitals      Height 5 ft 10.00 in / 177.8 cm      Weight 204 lbs 5.863 oz / 92.7 kg      BSA 2.11 m2      BMI 29.3 kg/m2      Temperature 97.1 F / 36.17 C - Temporal      Pulse 97      Respirations 20      Blood Pressure 138/83 Sitting, Left Arm      Pulse Oximetry 93%, nasal beto, 2 lpm            Pain Score      Pain Scale Used:  Numerical      Pain Intensity:  0            Fatigue Score      Fatigue (0-10 scale):  0 (none)            Rehab to be Ordered      Type of Referral to be Ordered:  No needs identified            EXAM      General Appearance:  Positive for: Alert, Cooperative;          Negative for: Acute Distress      Eye:  Positive for: Anicteric Sclerae, Moist Conjunctiva      Neck:  Positive for: Supple;          Negative for: JVD, Masses      Respiratory:  Positive for: Normal Respiratory Effort;          Negative for: CTAB (Decreased breath sounds at the right base.  Scattered     crackles bilateral)      Abdomen/Gastro:  Positive for: Normal Active Bowel Sounds, Soft;          Negative for: Distention,  Hepatosplenomegaly, Tenderness      Cardiovascular:  Positive for: RRR;          Negative for: Gallop, Murmur, Peripheral Edema, Rub      Psychiatric:  Positive for: Appropriate Affect, Intact Judgement      Lymphatic:  Negative for: Axillary, Cervical, Epitrochlear, Infraclavicular,     Supraclavicular            PREVENTION      Hx Influenza Vaccination:  No      Influenza Vaccine Declined:  Yes      2 or More Falls in Past Year?:  No      Fall Past Year with Injury?:  No      Hx Pneumococcal Vaccination:  No      Encouraged to follow-up with:  PCP regarding preventative exams.      Chart initiated by      romelia mayer ma            ALLERGY/MEDS      Allergies      Coded Allergies:             SULFAMETHOXAZOLE (Verified  Allergy, Severe, HIVES, 10/12/20)           TRIMETHOPRIM (Verified  Allergy, Severe, HIVES, 10/12/20)      Uncoded Allergies:             LISNOPRIL (Allergy, Severe, TONGUE AND LIPS SWELL, 9/28/20)            Medications      Last Reconciled on 10/12/20 13:07 by SATISH FLOREZ      levoFLOXacin (levoFLOXacin) 750 Mg Tablet      750 MG PO QDAY for 7 Days, #7 TAB 0 Refills         Prov: SATISH FLOREZ         10/12/20       Albuterol/Ipratropium (Duoneb) 3 Ml Ampul.neb      3 ML INH RTQ6H WA for 30 Days, #90 NEB         Prov: SHIVA BARRAZA         10/1/20       Potassium Chloride (K-Dur*) 20 Meq Tab.er.prt      40 MEQ PO HS, #30 TAB 0 Refills         Reported         9/29/20       Potassium Chloride (K-Dur*) 20 Meq Tab.er.prt      60 MEQ PO QAM, #30 TAB 0 Refills         Reported         9/29/20       (Amox/Cla)   No Conflict Check      875 MG PO BIDAC         Reported         9/29/20       Mycophenolate Mofetil (Mycophenolate Mofetil) 250 Mg Capsule      1000 MG PO BIDAC, #240 CAP 0 Refills         Reported         9/29/20       Sertraline HCl (Sertraline*) 50 Mg Tablet      50 MG PO QDAY, #30 TAB 0 Refills         Reported         9/29/20       Gabapentin (Gabapentin) 300 Mg Capsule      300 MG PO  HS, #30 CAP 0 Refills         Reported         9/28/20       (sitagliptin/metform)   No Conflict Check       MG PO QDAY         Reported         9/28/20       Atorvastatin (Atorvastatin) 40 Mg Tablet      40 MG PO HS, #30 TAB 0 Refills         Reported         9/28/20       Imipramine HCl (Imipramine HCl) 50 Mg Tablet      50 MG PO HS, #30 TAB 0 Refills         Reported         9/28/20       Loratadine/Pseudoephedrine 10/240 MG (CLARITIN-D 24 HOUR TABLET) 1 Each     Tab.er.24h      1 TAB PO QDAY, TAB         Reported         9/28/20       Multivitamin (Centrum Silver*) 1 Tab Tab      1 EA PO QDAY, TAB         Reported         7/10/13       amLODIPine (amLODIPine) 5 Mg Tablet      5 MG PO QAM         Reported         7/9/13       Magnesium Oxide (Magnesium Oxide) 400 Mg Capsule      400 MG PO TID         Reported         7/9/13       Tacrolimus (Prograf) 1 Mg Cap      2 MG PO BID         Reported         7/9/13      Medications Reviewed:  Changes made to meds            IMPRESSION/PLAN      Diagnosis      Adenocarcinoma, lung         Adenocarcinoma of right lung         Laterality: right      Patient has biopsy-proven adenocarcinoma the right lung.  Staging reveals     abnormalities in the right adrenal gland, multiple bone lesions as well as     malignant pleural effusion.  I discussed the case with his pulmonologist, Dr. Wong who will place Pleurx catheter tomorrow for his symptomatic malignant     effusion.  He has been seen by radiation oncology and MRI of the thoracic spine     ordered for the T1 lesion noted on PET scan.      I discussed that this is unfortunately not curable being stage IV/metastatic but    there may be treatments that could offer improvement in symptoms as well as some    modest prolongation of life.  His cardiac transplant and use of immunosupp    ression definitely complicates his therapy and I would recommend that he be     treated at a [place] setting where they can  monitor his heart with his     transplant team.  He will be referred to [facility] for that reason.      Chemotherapy can be given in the setting of transplant but individuals tend not     to tolerated as well.  Immunotherapy would not be an option because of his     ongoing immunosuppression to prevent rejection.  I will request next generation     sequencing to see if he has any actionable mutations.  I would recommend adding     either bisphosphonate or Xgeva to systemic therapy to help mitigate skeletal     events.  I will defer his treatment at [facility] but I am happy to as    sist to the extent possible here locally.            Pneumonia         Pneumonia of left lung due to infectious organism, unspecified part of lung         Pneumonia type: due to unspecified organism         Laterality: left         Lung location: unspecified part of lung      Patient is clinical symptoms suggestive of pneumonia on the left.  Begin     Levaquin 750 mg daily for 1 week.  Follow-up with pulmonology as discussed.            Notes      New Medications      * levoFLOXacin 750 MG TABLET: 750 MG PO QDAY 7 Days #7         Dx: Adenocarcinoma, lung - C34.90      Discontinued Medications      * AMOXICILLIN/CLAVULANATE K (Augmentin 875/125 Mg) 1 EACH TABLET: 875 MG PO BID       6 Days #12      New Referrals      * Oncology         Dx: Adenocarcinoma, lung - C34.90            Pain      Pain Zero Today            Advanced Care Plan Discussion      Declines Discussion [id number]            Patient Education            Lung Cancer      Patient Education Provided:  Yes            Electronically signed by SATISH FLOREZ  10/12/2020 13:07       Disclaimer: Converted document may not contain table formatting or lab diagrams. Please see [facility] for the authenticated document.

## 2021-05-28 NOTE — PROGRESS NOTES
Patient: LUZ ELENA XIAO     Acct: [id number]     Report: #[id number]  UNIT #: [id number]     : 1950    Encounter Date:10/21/2020  PRIMARY CARE: HERACLIO LANTIGUA  ***Signed***  --------------------------------------------------------------------------------------------------------------------  Chief Complaint      Encounter Date      Oct 21, 2020            Primary Care Provider      HERACLIO LANTIGUA            Referring Provider      HERACLIO LANTIGUA            Patient Complaint      Patient is complaining of      hemoptysis            VITALS      Height 5 ft 10 in / 177.8 cm      Weight 200 lbs 8 oz / 90.150791 kg      BSA 2.09 m2      BMI 28.8 kg/m2      Temperature 97 F / 36.11 C - Tympanic      Pulse 125      Respirations 20      Blood Pressure 126/74 Sitting, Right Arm      Pulse Oximetry 90%, nasal cannula, 2 lpm            HPI      The patient is a very pleasant 70 year old  male former     cigarettes smoker with cardiac transplantation 8 years ago with recent diagnosis    of stage IV adenocarcinoma of the lung here for evaluation for hemoptysis.             The patient had a Pleurx catheter placed for apparent malignant pleural effusion    a couple weeks ago and he has been draining about 900 ml every other day per his    wife and is doing better in that regard. He coughed up some thick bloody sputum     with tissue 2-3 days ago and it has since resolved. He now has cough productive     of thick yellow and green sputum, increasing wheezing and shortness of breath.     He gets short of breath doing very little and wheezes audibly when lying down or    walking. He can walk about 400 feet without having to stop. He denies any chest     pain or weight loss. He has seen Dr. 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 He is asymptomatic regarding this and we will follow     him up with chemotherapy. He is going to see an oncologist at [facility] tomorrow  so we will coordinate chemotherapy at his transplant center to     adjust transplant medications as needed. He denies any nausea or vomiting, fever    or chills, headaches and hemoptysis or chest pain or weight loss. He is able to     perform his ADL's without difficulty and denies any swollen glands in his lymph     nodes, head or neck.            I personally reviewed Review of Systems, family, social, surgical and medical     history and agree with their findings.            ROS      Constitutional:  Complains of: Fatigue; Denies: Fever, Weight gain, Weight loss,    Chills, Insomnia, Other      Respiratory/Breathing:  Complains of: Shortness of air, Wheezing, Cough; Denies:    Hemoptysis, Pleuritic pain, Other      Endocrine:  Denies: Polydipsia, Polyuria, Heat/cold intolerance, Diabetes, Other      Eyes:  Denies: Blurred vision, Vision Changes, Other      Ears, nose, mouth, throat:  Denies: Mouth lesions, Thrush, Throat pain,     Hoarseness, Allergies/Hay Fever, Post Nasal Drip, Headaches, Recent Head Injury,    Nose Bleeding, Neck Stiffness, Thyroid Mass, Hearing Loss, Ear Fullness, Dry Mo    uth, Nasal or Sinus Pain, Dry Lips, Nasal discharge, Nasal congestion, Other      Cardiovascular:  Denies: Palpitations, Syncope, Claudication, Chest Pain, Wake     up Gasping for air, Leg Swelling, Irregular Heart Rate, Cyanosis, Dyspnea on Ex    ertion, Other      Gastrointestinal:  Denies: Nausea, Constipation, Diarrhea, Abdominal pain,     Vomiting, Difficulty Swallowing, Reflux/Heartburn, Dysphagia, Jaundice,     Bloating, Melena, Bloody stools, Other      Genitourinary:  Denies: Urinary frequency, Incontinence, Hematuria, Urgency,     Nocturia, Dysuria, Testicular problems, Other      Musculoskeletal:  Denies: Joint Pain, Joint Stiffness, Joint Swelling, Myalgias,    Other      Hematologic/lymphatic:  DENIES: Lymphadenopathy, Bruising, Bleeding tendencies,     Other      Neurological:  Denies: Headache, Numbness,  Weakness, Seizures, Other      Psychiatric:  Denies: Anxiety, Appropriate Effect, Depression, Other      Sleep:  No: Excessive daytime sleep, Morning Headache?, Snoring, Insomnia?, Stop    breathing at sleep?, Other      Integumentary:  Denies: Rash, Dry skin, Skin Warm to Touch, Other      Immunologic/Allergic:  Denies: Latex allergy, Seasonal allergies, Asthma,     Urticaria, Eczema, Other      Immunization status:  No: Up to date            FAMILY/SOCIAL/MEDICAL HX      Surgical History:  Yes: Bladder Surgery (TURP), Bowel Surgery (rectal prolapse     repair); No: AAA Repair, Abdominal Surgery, Adenoids, Angioplasty, Appendectomy,    Back Surgery, Breast Surgery, CABG, Carotid Stenosis, Cholecystectomy, Ear     Surgery, Eye Surgery, Head Surgery, Hernia Surgery, Kidney Surgery, Nose     Surgery, Oral Surgery, Orthopedic Surgery, Prostatectomy, Rectal Surgery, Spinal    Surgery, Testicular Surgery, Throat Surgery, Tonsils, Valve Replacement,     Vascular Surgery, Other Surgeries      Is Father Still Living?:  No      Is Mother Still Living?:  No       Family History:  Yes      Social History:  No Tobacco Use, No Alcohol Use, No Recreational Drug use      Smoking status:  Former smoker (STARTED AT AGE 17 AND QUIT IN 2012)      Currently Vaping:  No      Smoking history:  25-50 pack years      Antibiotic Prophylaxis:  No      Medical History:  Yes: Chemotherapy/Cancer (LUNG RT LUNG MASS DX ON 10/5/20),     Congestive Heart Failu (Prior to transplant), Diabetes (is watched closely due     to med. induced Diabetes), High Blood Pressure; No: Alcoholism, Allergies,     Anemia, Arthritis, Asthma, Atrial Fibrillation, Blood Disease, Broken Bones,     Cataracts, Chemical Dependency, Chronic Bronchitis/COPD, Emphysema, Chronic     Liver Disease, Colon Trouble, Colitis, Diverticulitis, Deafness or Ringing Ears,    Convulsions, Depression, Anxiety, Bipolar Disorder, PTSD, Epilepsy, Seizures,     Forgetfullness, Glaucoma, Gall  Stones, Gout, Head Injury, Heart Attack, Heart     Murmur, GERD, Hemorrhoids/Rectal Prob, Hepatitis, Hiatal Hernia, High     Cholesterol, HIV (Do not ask - volu, Jaundice, Kidney or Bladder Disease, Kidney    Stones, Migrane Headaches, Mitral Valve Prolapse, Night sweats, Phlebitis,     Psychiatric Care, Reflux Disease, Rheumatic Fever, Sexually Transmitted Dis,     Shortness Of Breath, Sinus Trouble, Skin Disease/Psoriais/Ecz, Stroke, Thyroid     Problem, Tuberculosis or Pos TB Te, Miscellaneous Medical/oth      Psychiatric History      none            PREVENTION      Hx Influenza Vaccination:  No      Influenza Vaccine Declined:  Yes      2 or More Falls in Past Year?:  No      Fall Past Year with Injury?:  Yes      Hx Pneumococcal Vaccination:  No      Encouraged to follow-up with:  PCP regarding preventative exams.      Chart initiated by      Louise Sifuentes CMA            ALLERGIES/MEDICATIONS      Allergies:        Coded Allergies:             SULFAMETHOXAZOLE (Verified  Allergy, Severe, HIVES, 10/21/20)           TRIMETHOPRIM (Verified  Allergy, Severe, HIVES, 10/21/20)      Uncoded Allergies:             LISNOPRIL (Allergy, Severe, TONGUE AND LIPS SWELL, 9/28/20)      Medications    Last Reconciled on 10/21/20 13:49 by YAHIR CHEN MD      Fluticasone (Flovent HFA) 220 Mcg Inhaler      1 PUFF INH RTBID, #1 INH 3 Refills         Prov: YAHIR CHEN         10/21/20       levoFLOXacin (levoFLOXacin) 750 Mg Tablet      750 MG PO QDAY for 7 Days, #7 TAB 0 Refills         Prov: YAHIR CHEN         10/21/20       predniSONE (Deltasone) 10 Mg Tablet      10 MG PO ASDIR, #45 TAB 0 Refills         Prov: YAHIR CHEN         10/21/20       Apixaban (Eliquis) 5 Mg Tablet      10 MG PO BID for 30 Days, #72 TAB         Prov: Santa Russell         10/14/20       Albuterol/Ipratropium (Duoneb) 3 Ml Ampul.neb      3 ML INH RTQ6H WA for 30 Days, #90 NEB         Prov: SHIVA BARRAZA         10/1/20       Potassium Chloride  (K-Dur*) 20 Meq Tab.er.prt      40 MEQ PO HS, #30 TAB 0 Refills         Reported         9/29/20       Potassium Chloride (K-Dur*) 20 Meq Tab.er.prt      60 MEQ PO QAM, #30 TAB 0 Refills         Reported         9/29/20       (Amox/Cla)   No Conflict Check      875 MG PO BIDAC         Reported         9/29/20       Mycophenolate Mofetil (Mycophenolate Mofetil) 250 Mg Capsule      1000 MG PO BIDAC, #240 CAP 0 Refills         Reported         9/29/20       Sertraline HCl (Sertraline*) 50 Mg Tablet      50 MG PO QDAY, #30 TAB 0 Refills         Reported         9/29/20       Gabapentin (Gabapentin) 300 Mg Capsule      300 MG PO HS, #30 CAP 0 Refills         Reported         9/28/20       (sitagliptin/metform)   No Conflict Check       MG PO QDAY         Reported         9/28/20       Atorvastatin (Atorvastatin) 40 Mg Tablet      40 MG PO HS, #30 TAB 0 Refills         Reported         9/28/20       Imipramine HCl (Imipramine HCl) 50 Mg Tablet      50 MG PO HS, #30 TAB 0 Refills         Reported         9/28/20       Loratadine/Pseudoephedrine 10/240 MG (CLARITIN-D 24 HOUR TABLET) 1 Each     Tab.er.24h      1 TAB PO QDAY, TAB         Reported         9/28/20       Multivitamin (Centrum Silver*) 1 Tab Tab      1 EA PO QDAY, TAB         Reported         7/10/13       amLODIPine (amLODIPine) 5 Mg Tablet      5 MG PO QAM         Reported         7/9/13       Magnesium Oxide (Magnesium Oxide) 400 Mg Capsule      400 MG PO TID         Reported         7/9/13       Tacrolimus (Prograf) 1 Mg Cap      2 MG PO BID         Reported         7/9/13      Current Medications      Current Medications Reviewed 10/21/20            EXAM      Vital Signs Reviewed      Gen: WDWN, Alert, NAD.        HEENT:  PERRL, EOMI.  OP, nares clear, no sinus tenderness.      Neck:  Supple, no JVD, no thyromegaly.      Lymph: No axillary, cervical, supraclavicular lymphadenopathy noted bilaterally.      Chest: Left chest Pleurx catheter is clean, dry  and intact.  Good aeration,     coarse rhonchi in the left chest, tympanic to percussion bilaterally, no work of     breathing noted.      CV:  RRR, no MGR, pulses 2+, equal.      Abd:  Soft, NT, ND, + BS, no HSM.      EXT:  No clubbing, no cyanosis, no edema, no joint tenderness.       Neuro:  A  Skin: No rashes or lesions.      Vtials      Vitals:             Height 5 ft 10 in / 177.8 cm           Weight 200 lbs 8 oz / 90.599457 kg           BSA 2.09 m2           BMI 28.8 kg/m2           Temperature 97 F / 36.11 C - Tympanic           Pulse 125           Respirations 20           Blood Pressure 126/74 Sitting, Right Arm           Pulse Oximetry 90%, nasal cannula, 2 lpm            REVIEW      Results Reviewed      PCCS Results Reviewed?:  Yes Prev Lab Results, Yes Prev Radiology Results, Yes     Previous Mecial Records      Lab Results      I personally reviewed radiation oncology office notes.      Radiographic Results               [facility]                PACS RADIOLOGY REPORT            Patient: LUZ ELENA XIAO   Acct: #[id number]   Report: #[id number]            UNIT #: [id number]    DOS: 10/15/20 1004      INSURANCE:MEDICARE PART A   LOCATION:[facility]     : 1950            PROVIDERS      ADMITTING:     ATTENDING: YAHIR CHEN      FAMILY:  NONE,MD   ORDERING:  YAHIR CHEN         OTHER:    DICTATING:  CRYSTAL PACHECO MD            REQ #:[id number]   EXAM:TSWOM - MRI THORACIC SPINE wo CONT      REASON FOR EXAM:  LYTIC LESION ON T1      REASON FOR VISIT:  LYTIC LESION ON T1            *******Signed******         PROCEDURE:   MRI THORACIC SPINE WITHOUT CONTRAST             COMPARISON:   [facility], PET, SKULL BASE TO MID THIGH INITIAL,     10/06/2020, 15:05.             INDICATIONS:   LYTIC LESION ON T1             TECHNIQUE:   A variety of imaging planes and parameters were utilized for     visualization of suspected        pathology.  Images were performed without contrast.               FINDINGS:         The thoracic vertebral bodies have normal height.  Alignment is preserved.      There is an abnormal T2       hyperintense/T1 hypointense lesion in the central T1 vertebral body measuring up     to 1.6 cm.  There       is some surrounding edema in the remainder of the T1 vertebral body.  No other     abnormal signal seen       in the other thoracic vertebral bodies.  There is no significant central canal     narrowing or       abnormal thoracic cord signal.  There is extensive abnormal stick known seen     throughout included       portions of the right lung.               CONCLUSION:   Lesion in the T1 vertebral body, most suspicious for osseous     metastatic disease given       provided history.  This correlates with the hypermetabolic lesion on the     comparison PET-CT.             No other thoracic vertebral body lesions are seen.             Extensive abnormal opacity throughout the right lung, better characterized with     CT.              CRYSTAL PACHECO MD             Electronically Signed and Approved By: CRYSTAL PACHECO MD on 10/15/2020 at 11:23                               Until signed, this is an unconfirmed preliminary report that may contain      errors and is subject to change.                                              ARIADNAER:      D:10/15/20 1123                     [facility]                PACS RADIOLOGY REPORT            Patient: LUZ ELENA XIAO   Acct: #[id number]   Report: #[id number]            UNIT #: [id number]    DOS: 10/13/20 1323      INSURANCE:MEDICARE PART A   LOCATION:[address] [place] [address]   : 1950            PROVIDERS      ADMITTING:  Santa Russell   ATTENDING: Santa Russell      FAMILY:  HERACLIO LANTIGUA   ORDERING:  YAHIR CHEN         OTHER:    DICTATING:  Crow Tyler MD            REQ #:[id number]   EXAM:CXRPORTABL - Portable Chest xray 1  view      REASON FOR EXAM:  s/p right pleurx      REASON FOR VISIT:  PE, CANCER            *******Signed******         PROCEDURE:   PORTABLE CHEST X-RAY             COMPARISON:   [facility], CR, CHEST AP/PA 1 VIEW, 10/13/2020,     0:24.             INDICATIONS:   s/p right pleurx             FINDINGS:         Cardiac size is normal with postoperative changes of prior sternotomy.  There     has been insertion of       a right-sided PleurX catheter.  It lies at the right lung base.  There is a very     small right a       pickle pneumothorax.  Dense consolidation persists in the right upper lobe.             CONCLUSION:   Interim insertion of a right-sided PleurX catheter in good     position with a small apical       pneumothorax.              Crow Tyler MD             Electronically Signed and Approved By: Crow Tyler MD on 10/13/2020 at 14:03                                   Until signed, this is an unconfirmed preliminary report that may contain      errors and is subject to change.                                              HERNANDOO:      D:10/13/20 1403            Assessment      Hemoptysis - R04.2            Notes      New Medications      * predniSONE (Deltasone) 10 MG TABLET: 10 MG PO ASDIR #45         Instructions: [id number],08oyk3w,62avl6c,19pip0r,72jgl4j      * levoFLOXacin 750 MG TABLET: 750 MG PO QDAY 7 Days #7      * Fluticasone (Flovent HFA) 220 MCG INHALER: 1 PUFF INH RTBID #1      New Diagnostics      * Sputum Culture W/Gram Stain, Week         Dx: Hemoptysis - R04.2      IMPRESSION:      1. Adenocarcinoma of the lung stage IV       2. Recurrent malignant right pleural effusion status post Pleurx catheter.       3. Endobronchial lesion of the right lung consistent with adenocarcinoma.       4. Acute dyspnea.       5. Acute cough.       6. Postobstructive pneumonia from an unspecified organism.      7. Immunosuppressessed status on Cellcept and Prograf.      8. History of cardiac  transplantation in 2003.      9. Tobacco abuse of cigarettes in remission.            PLAN:      1. I recommend the patient keep his appointment with the [facility]     tomorrow. Most of his symptoms are secondary to progressive cancer of the lung     and I recommend he be treated as soon as possible with concomitant     chemoradiation per their recommendations.       2. I appreciate Dr. Casey of radiation oncology seeing the patient. He would     hold off on doing any radiation to the thoracic met at this time and see if     chemotherapy can shrink this.       3. Continue to drain up to 1 liter with Pleurx catheter every other day.       4. Cardiac rejection mediations and immune suppression per cardiac transplant     program at [facility].       5. We will give 7 days of Levaquin and check a sputum culture given purulent     sputum.       6. We will give qvar 80 twice daily and continue albuterol nebulizer as needed.       7. We will give 2 week steroid taper.       8. He is up to date with Fluzone, Prevnar and Pneumovax.       9. Follow up with us a scheduled in 2 months.            Patient Education      Patient Education Provided:  Lung Cancer            Electronically signed by YAHIR CHEN  10/23/2020 10:24       Disclaimer: Converted document may not contain table formatting or lab diagrams. Please see [employer] for the authenticated document.